# Patient Record
Sex: MALE | Race: WHITE
[De-identification: names, ages, dates, MRNs, and addresses within clinical notes are randomized per-mention and may not be internally consistent; named-entity substitution may affect disease eponyms.]

---

## 2019-03-06 ENCOUNTER — RECORD ABSTRACTING (OUTPATIENT)
Age: 64
End: 2019-03-06

## 2019-03-06 DIAGNOSIS — Z82.5 FAMILY HISTORY OF ASTHMA AND OTHER CHRONIC LOWER RESPIRATORY DISEASES: ICD-10-CM

## 2019-03-06 DIAGNOSIS — Z82.49 FAMILY HISTORY OF ISCHEMIC HEART DISEASE AND OTHER DISEASES OF THE CIRCULATORY SYSTEM: ICD-10-CM

## 2019-03-06 DIAGNOSIS — Z78.9 OTHER SPECIFIED HEALTH STATUS: ICD-10-CM

## 2019-03-06 DIAGNOSIS — M16.12 UNILATERAL PRIMARY OSTEOARTHRITIS, LEFT HIP: ICD-10-CM

## 2019-03-06 DIAGNOSIS — M16.11 UNILATERAL PRIMARY OSTEOARTHRITIS, RIGHT HIP: ICD-10-CM

## 2019-03-06 DIAGNOSIS — Z83.3 FAMILY HISTORY OF DIABETES MELLITUS: ICD-10-CM

## 2019-03-06 DIAGNOSIS — Z80.3 FAMILY HISTORY OF MALIGNANT NEOPLASM OF BREAST: ICD-10-CM

## 2022-03-01 ENCOUNTER — INPATIENT (INPATIENT)
Facility: HOSPITAL | Age: 67
LOS: 1 days | Discharge: HOME | DRG: 310 | End: 2022-03-03
Attending: FAMILY MEDICINE | Admitting: INTERNAL MEDICINE
Payer: COMMERCIAL

## 2022-03-01 VITALS — HEIGHT: 76 IN | WEIGHT: 298.95 LBS

## 2022-03-01 LAB
ALBUMIN SERPL ELPH-MCNC: 3.6 G/DL — SIGNIFICANT CHANGE UP (ref 3.3–5)
ALP SERPL-CCNC: 38 U/L — LOW (ref 40–120)
ALT FLD-CCNC: 37 U/L — SIGNIFICANT CHANGE UP (ref 12–78)
ANION GAP SERPL CALC-SCNC: 4 MMOL/L — LOW (ref 5–17)
AST SERPL-CCNC: 24 U/L — SIGNIFICANT CHANGE UP (ref 15–37)
BASOPHILS # BLD AUTO: 0.03 K/UL — SIGNIFICANT CHANGE UP (ref 0–0.2)
BASOPHILS NFR BLD AUTO: 0.4 % — SIGNIFICANT CHANGE UP (ref 0–2)
BILIRUB SERPL-MCNC: 0.4 MG/DL — SIGNIFICANT CHANGE UP (ref 0.2–1.2)
BUN SERPL-MCNC: 27 MG/DL — HIGH (ref 7–23)
CALCIUM SERPL-MCNC: 8.9 MG/DL — SIGNIFICANT CHANGE UP (ref 8.5–10.1)
CHLORIDE SERPL-SCNC: 109 MMOL/L — HIGH (ref 96–108)
CO2 SERPL-SCNC: 25 MMOL/L — SIGNIFICANT CHANGE UP (ref 22–31)
CREAT SERPL-MCNC: 1.12 MG/DL — SIGNIFICANT CHANGE UP (ref 0.5–1.3)
EGFR: 73 ML/MIN/1.73M2 — SIGNIFICANT CHANGE UP
EOSINOPHIL # BLD AUTO: 0.22 K/UL — SIGNIFICANT CHANGE UP (ref 0–0.5)
EOSINOPHIL NFR BLD AUTO: 3.1 % — SIGNIFICANT CHANGE UP (ref 0–6)
GLUCOSE SERPL-MCNC: 123 MG/DL — HIGH (ref 70–99)
HCT VFR BLD CALC: 45.1 % — SIGNIFICANT CHANGE UP (ref 39–50)
HGB BLD-MCNC: 14.8 G/DL — SIGNIFICANT CHANGE UP (ref 13–17)
IMM GRANULOCYTES NFR BLD AUTO: 0.3 % — SIGNIFICANT CHANGE UP (ref 0–1.5)
LYMPHOCYTES # BLD AUTO: 2.26 K/UL — SIGNIFICANT CHANGE UP (ref 1–3.3)
LYMPHOCYTES # BLD AUTO: 32.1 % — SIGNIFICANT CHANGE UP (ref 13–44)
MCHC RBC-ENTMCNC: 28.6 PG — SIGNIFICANT CHANGE UP (ref 27–34)
MCHC RBC-ENTMCNC: 32.8 GM/DL — SIGNIFICANT CHANGE UP (ref 32–36)
MCV RBC AUTO: 87.1 FL — SIGNIFICANT CHANGE UP (ref 80–100)
MONOCYTES # BLD AUTO: 0.82 K/UL — SIGNIFICANT CHANGE UP (ref 0–0.9)
MONOCYTES NFR BLD AUTO: 11.6 % — SIGNIFICANT CHANGE UP (ref 2–14)
NEUTROPHILS # BLD AUTO: 3.7 K/UL — SIGNIFICANT CHANGE UP (ref 1.8–7.4)
NEUTROPHILS NFR BLD AUTO: 52.5 % — SIGNIFICANT CHANGE UP (ref 43–77)
PLATELET # BLD AUTO: 165 K/UL — SIGNIFICANT CHANGE UP (ref 150–400)
POTASSIUM SERPL-MCNC: 3.7 MMOL/L — SIGNIFICANT CHANGE UP (ref 3.5–5.3)
POTASSIUM SERPL-SCNC: 3.7 MMOL/L — SIGNIFICANT CHANGE UP (ref 3.5–5.3)
PROT SERPL-MCNC: 7.1 GM/DL — SIGNIFICANT CHANGE UP (ref 6–8.3)
RBC # BLD: 5.18 M/UL — SIGNIFICANT CHANGE UP (ref 4.2–5.8)
RBC # FLD: 13 % — SIGNIFICANT CHANGE UP (ref 10.3–14.5)
SODIUM SERPL-SCNC: 138 MMOL/L — SIGNIFICANT CHANGE UP (ref 135–145)
TROPONIN I, HIGH SENSITIVITY RESULT: 11.41 NG/L — SIGNIFICANT CHANGE UP
WBC # BLD: 7.05 K/UL — SIGNIFICANT CHANGE UP (ref 3.8–10.5)
WBC # FLD AUTO: 7.05 K/UL — SIGNIFICANT CHANGE UP (ref 3.8–10.5)

## 2022-03-01 PROCEDURE — 71045 X-RAY EXAM CHEST 1 VIEW: CPT | Mod: 26

## 2022-03-01 PROCEDURE — 70450 CT HEAD/BRAIN W/O DYE: CPT

## 2022-03-01 PROCEDURE — 36415 COLL VENOUS BLD VENIPUNCTURE: CPT

## 2022-03-01 PROCEDURE — 99222 1ST HOSP IP/OBS MODERATE 55: CPT

## 2022-03-01 PROCEDURE — 83036 HEMOGLOBIN GLYCOSYLATED A1C: CPT

## 2022-03-01 PROCEDURE — 92960 CARDIOVERSION ELECTRIC EXT: CPT

## 2022-03-01 PROCEDURE — 93325 DOPPLER ECHO COLOR FLOW MAPG: CPT

## 2022-03-01 PROCEDURE — 70551 MRI BRAIN STEM W/O DYE: CPT

## 2022-03-01 PROCEDURE — 86803 HEPATITIS C AB TEST: CPT

## 2022-03-01 PROCEDURE — 93005 ELECTROCARDIOGRAM TRACING: CPT

## 2022-03-01 PROCEDURE — 99285 EMERGENCY DEPT VISIT HI MDM: CPT

## 2022-03-01 PROCEDURE — 93010 ELECTROCARDIOGRAM REPORT: CPT

## 2022-03-01 PROCEDURE — 93312 ECHO TRANSESOPHAGEAL: CPT

## 2022-03-01 PROCEDURE — 84484 ASSAY OF TROPONIN QUANT: CPT

## 2022-03-01 PROCEDURE — 85610 PROTHROMBIN TIME: CPT

## 2022-03-01 PROCEDURE — 80053 COMPREHEN METABOLIC PANEL: CPT

## 2022-03-01 PROCEDURE — 93320 DOPPLER ECHO COMPLETE: CPT

## 2022-03-01 PROCEDURE — 80061 LIPID PANEL: CPT

## 2022-03-01 PROCEDURE — 85730 THROMBOPLASTIN TIME PARTIAL: CPT

## 2022-03-01 PROCEDURE — 85025 COMPLETE CBC W/AUTO DIFF WBC: CPT

## 2022-03-01 RX ORDER — ASPIRIN/CALCIUM CARB/MAGNESIUM 324 MG
325 TABLET ORAL ONCE
Refills: 0 | Status: COMPLETED | OUTPATIENT
Start: 2022-03-01 | End: 2022-03-01

## 2022-03-01 RX ORDER — DILTIAZEM HCL 120 MG
10 CAPSULE, EXT RELEASE 24 HR ORAL ONCE
Refills: 0 | Status: DISCONTINUED | OUTPATIENT
Start: 2022-03-01 | End: 2022-03-01

## 2022-03-01 RX ADMIN — Medication 325 MILLIGRAM(S): at 22:50

## 2022-03-01 NOTE — ED ADULT NURSE NOTE - CHIEF COMPLAINT QUOTE
c/o dizziness 30 minutes PTA, HR at home 155, O2 sat in triage 96% on RA, pulse 77, patient started on beta blocker for PVC last month, denies pain, patient states he has been feeling chest tightness and fatigue for past few days, was positive covid on 01/25/2022

## 2022-03-01 NOTE — ED ADULT NURSE NOTE - OBJECTIVE STATEMENT
67 y/o a&ox4 male pt. presents to the ED c/o dizziness, fatigue today. pt. states that he laid down to get into bed this morning and "felt like the bed flipped." pt. recently put on beta blockers for PVC's. hx of afib that resolved on its own. pt on cardiac monitor now shows afib with HR . pt. denies fevers, headache, chills, n/v/d.

## 2022-03-01 NOTE — ED ADULT TRIAGE NOTE - CHIEF COMPLAINT QUOTE
c/o dizziness 30 minutes PTA, HR at home 155, O2 sat in triage 965 on RA, pulse 77, patient started on beta blocker for PVC last month, denies pain, patient states he has been feeling chest tightness and fatigue for past few days, was positive covid on 01/25/2022 c/o dizziness 30 minutes PTA, HR at home 155, O2 sat in triage 96% on RA, pulse 77, patient started on beta blocker for PVC last month, denies pain, patient states he has been feeling chest tightness and fatigue for past few days, was positive covid on 01/25/2022

## 2022-03-01 NOTE — ED PROVIDER NOTE - OBJECTIVE STATEMENT
67 y/o male in ED c/o episodes of dizziness with palpitations today.   pt states was started on BB by his PMD last month.    pt states h/o AF 12 years ago that resolved and not on meds for AF.   pt denies any fever, HA, cp, sob, n/v/d/abd pain.

## 2022-03-01 NOTE — PHARMACOTHERAPY INTERVENTION NOTE - COMMENTS
Medication reconciliation completed.  Reviewed Medication list and confirmed med allergies with patient; confirmed with Dr. First Medenma.

## 2022-03-02 ENCOUNTER — TRANSCRIPTION ENCOUNTER (OUTPATIENT)
Age: 67
End: 2022-03-02

## 2022-03-02 DIAGNOSIS — I48.91 UNSPECIFIED ATRIAL FIBRILLATION: ICD-10-CM

## 2022-03-02 LAB
A1C WITH ESTIMATED AVERAGE GLUCOSE RESULT: 5.8 % — HIGH (ref 4–5.6)
ALBUMIN SERPL ELPH-MCNC: 3.6 G/DL — SIGNIFICANT CHANGE UP (ref 3.3–5)
ALP SERPL-CCNC: 34 U/L — LOW (ref 40–120)
ALT FLD-CCNC: 32 U/L — SIGNIFICANT CHANGE UP (ref 12–78)
ANION GAP SERPL CALC-SCNC: 7 MMOL/L — SIGNIFICANT CHANGE UP (ref 5–17)
APTT BLD: 25.6 SEC — LOW (ref 27.5–35.5)
AST SERPL-CCNC: 18 U/L — SIGNIFICANT CHANGE UP (ref 15–37)
BASOPHILS # BLD AUTO: 0.03 K/UL — SIGNIFICANT CHANGE UP (ref 0–0.2)
BASOPHILS NFR BLD AUTO: 0.5 % — SIGNIFICANT CHANGE UP (ref 0–2)
BILIRUB SERPL-MCNC: 0.7 MG/DL — SIGNIFICANT CHANGE UP (ref 0.2–1.2)
BUN SERPL-MCNC: 21 MG/DL — SIGNIFICANT CHANGE UP (ref 7–23)
CALCIUM SERPL-MCNC: 9 MG/DL — SIGNIFICANT CHANGE UP (ref 8.5–10.1)
CHLORIDE SERPL-SCNC: 110 MMOL/L — HIGH (ref 96–108)
CHOLEST SERPL-MCNC: 224 MG/DL — HIGH
CO2 SERPL-SCNC: 25 MMOL/L — SIGNIFICANT CHANGE UP (ref 22–31)
CREAT SERPL-MCNC: 0.87 MG/DL — SIGNIFICANT CHANGE UP (ref 0.5–1.3)
EGFR: 96 ML/MIN/1.73M2 — SIGNIFICANT CHANGE UP
EOSINOPHIL # BLD AUTO: 0.18 K/UL — SIGNIFICANT CHANGE UP (ref 0–0.5)
EOSINOPHIL NFR BLD AUTO: 2.7 % — SIGNIFICANT CHANGE UP (ref 0–6)
ESTIMATED AVERAGE GLUCOSE: 120 MG/DL — HIGH (ref 68–114)
GLUCOSE SERPL-MCNC: 103 MG/DL — HIGH (ref 70–99)
HCT VFR BLD CALC: 44.3 % — SIGNIFICANT CHANGE UP (ref 39–50)
HCV AB S/CO SERPL IA: 0.09 S/CO — SIGNIFICANT CHANGE UP (ref 0–0.99)
HCV AB SERPL-IMP: SIGNIFICANT CHANGE UP
HDLC SERPL-MCNC: 56 MG/DL — SIGNIFICANT CHANGE UP
HGB BLD-MCNC: 14.7 G/DL — SIGNIFICANT CHANGE UP (ref 13–17)
IMM GRANULOCYTES NFR BLD AUTO: 0.2 % — SIGNIFICANT CHANGE UP (ref 0–1.5)
INR BLD: 1.09 RATIO — SIGNIFICANT CHANGE UP (ref 0.88–1.16)
LIPID PNL WITH DIRECT LDL SERPL: 150 MG/DL — HIGH
LYMPHOCYTES # BLD AUTO: 2.23 K/UL — SIGNIFICANT CHANGE UP (ref 1–3.3)
LYMPHOCYTES # BLD AUTO: 34 % — SIGNIFICANT CHANGE UP (ref 13–44)
MCHC RBC-ENTMCNC: 28.7 PG — SIGNIFICANT CHANGE UP (ref 27–34)
MCHC RBC-ENTMCNC: 33.2 GM/DL — SIGNIFICANT CHANGE UP (ref 32–36)
MCV RBC AUTO: 86.5 FL — SIGNIFICANT CHANGE UP (ref 80–100)
MONOCYTES # BLD AUTO: 0.72 K/UL — SIGNIFICANT CHANGE UP (ref 0–0.9)
MONOCYTES NFR BLD AUTO: 11 % — SIGNIFICANT CHANGE UP (ref 2–14)
NEUTROPHILS # BLD AUTO: 3.38 K/UL — SIGNIFICANT CHANGE UP (ref 1.8–7.4)
NEUTROPHILS NFR BLD AUTO: 51.6 % — SIGNIFICANT CHANGE UP (ref 43–77)
NON HDL CHOLESTEROL: 168 MG/DL — HIGH
PLATELET # BLD AUTO: 161 K/UL — SIGNIFICANT CHANGE UP (ref 150–400)
POTASSIUM SERPL-MCNC: 4 MMOL/L — SIGNIFICANT CHANGE UP (ref 3.5–5.3)
POTASSIUM SERPL-SCNC: 4 MMOL/L — SIGNIFICANT CHANGE UP (ref 3.5–5.3)
PROT SERPL-MCNC: 6.9 GM/DL — SIGNIFICANT CHANGE UP (ref 6–8.3)
PROTHROM AB SERPL-ACNC: 12.6 SEC — SIGNIFICANT CHANGE UP (ref 10.5–13.4)
RAPID RVP RESULT: SIGNIFICANT CHANGE UP
RBC # BLD: 5.12 M/UL — SIGNIFICANT CHANGE UP (ref 4.2–5.8)
RBC # FLD: 13 % — SIGNIFICANT CHANGE UP (ref 10.3–14.5)
SARS-COV-2 RNA SPEC QL NAA+PROBE: SIGNIFICANT CHANGE UP
SODIUM SERPL-SCNC: 142 MMOL/L — SIGNIFICANT CHANGE UP (ref 135–145)
TRIGL SERPL-MCNC: 88 MG/DL — SIGNIFICANT CHANGE UP
TROPONIN I, HIGH SENSITIVITY RESULT: 12.7 NG/L — SIGNIFICANT CHANGE UP
WBC # BLD: 6.55 K/UL — SIGNIFICANT CHANGE UP (ref 3.8–10.5)
WBC # FLD AUTO: 6.55 K/UL — SIGNIFICANT CHANGE UP (ref 3.8–10.5)

## 2022-03-02 PROCEDURE — 99232 SBSQ HOSP IP/OBS MODERATE 35: CPT

## 2022-03-02 PROCEDURE — 70450 CT HEAD/BRAIN W/O DYE: CPT | Mod: 26

## 2022-03-02 PROCEDURE — 99223 1ST HOSP IP/OBS HIGH 75: CPT

## 2022-03-02 PROCEDURE — 93010 ELECTROCARDIOGRAM REPORT: CPT

## 2022-03-02 PROCEDURE — 70551 MRI BRAIN STEM W/O DYE: CPT | Mod: 26

## 2022-03-02 RX ORDER — METOPROLOL TARTRATE 50 MG
100 TABLET ORAL DAILY
Refills: 0 | Status: DISCONTINUED | OUTPATIENT
Start: 2022-03-02 | End: 2022-03-02

## 2022-03-02 RX ORDER — APIXABAN 2.5 MG/1
5 TABLET, FILM COATED ORAL EVERY 12 HOURS
Refills: 0 | Status: DISCONTINUED | OUTPATIENT
Start: 2022-03-02 | End: 2022-03-02

## 2022-03-02 RX ORDER — ASPIRIN/CALCIUM CARB/MAGNESIUM 324 MG
325 TABLET ORAL DAILY
Refills: 0 | Status: DISCONTINUED | OUTPATIENT
Start: 2022-03-02 | End: 2022-03-02

## 2022-03-02 RX ORDER — METOPROLOL TARTRATE 50 MG
50 TABLET ORAL ONCE
Refills: 0 | Status: COMPLETED | OUTPATIENT
Start: 2022-03-02 | End: 2022-03-02

## 2022-03-02 RX ORDER — APIXABAN 2.5 MG/1
5 TABLET, FILM COATED ORAL EVERY 12 HOURS
Refills: 0 | Status: DISCONTINUED | OUTPATIENT
Start: 2022-03-02 | End: 2022-03-03

## 2022-03-02 RX ORDER — APIXABAN 2.5 MG/1
1 TABLET, FILM COATED ORAL
Qty: 60 | Refills: 0
Start: 2022-03-02 | End: 2022-03-31

## 2022-03-02 RX ORDER — METOPROLOL TARTRATE 50 MG
1 TABLET ORAL
Qty: 0 | Refills: 0 | DISCHARGE

## 2022-03-02 RX ORDER — METOPROLOL TARTRATE 50 MG
50 TABLET ORAL DAILY
Refills: 0 | Status: DISCONTINUED | OUTPATIENT
Start: 2022-03-02 | End: 2022-03-02

## 2022-03-02 RX ORDER — METOPROLOL TARTRATE 50 MG
100 TABLET ORAL DAILY
Refills: 0 | Status: DISCONTINUED | OUTPATIENT
Start: 2022-03-02 | End: 2022-03-03

## 2022-03-02 RX ORDER — METOPROLOL TARTRATE 50 MG
1 TABLET ORAL
Qty: 30 | Refills: 0
Start: 2022-03-02 | End: 2022-03-31

## 2022-03-02 RX ADMIN — APIXABAN 5 MILLIGRAM(S): 2.5 TABLET, FILM COATED ORAL at 10:37

## 2022-03-02 RX ADMIN — APIXABAN 5 MILLIGRAM(S): 2.5 TABLET, FILM COATED ORAL at 22:08

## 2022-03-02 RX ADMIN — Medication 325 MILLIGRAM(S): at 09:01

## 2022-03-02 RX ADMIN — Medication 50 MILLIGRAM(S): at 15:44

## 2022-03-02 RX ADMIN — Medication 50 MILLIGRAM(S): at 09:01

## 2022-03-02 NOTE — CONSULT NOTE ADULT - SUBJECTIVE AND OBJECTIVE BOX
Patient is a 66y old Male who presents with a chief complaint of dizziness (02 Mar 2022 04:17)    HPI:  65 y/o M w/ PMH of Afib (25 years ago, not on AC), HTN, presented to ED on 3/1/22 with c/o dizziness starting around 8pm. 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 Dizziness worsened when he turned his head to the right, there was no N/V. He also felt diaphoretic and noted his HR was elevated at 155. He went to ED, was found to have Afib, was then admitted for further evaluation. CTH was unremarkable.    Today, patient states dizziness has resolved. He did experience dizziness in ED last night, but has not had any further episodes today. He has been up and walked to the bathroom w/o any symptoms. Currently, he denies HA, lightheadedness, N/V, vision changes, speech disturbances, tinnitus, prior URI.     PAST MEDICAL & SURGICAL HISTORY:  Atrial fibrillation  S/Pappendectomy  S/P knee surgery  Abdominal hernia repair  L wrist surgery    FAMILY HISTORY:  Sister - breast cancer  mother - MI / DM2  father - emphysema / aortic aneurysm     Social Hx:  Former smoker at 19 y/o  Etoh - 1 drink per week  Denies any drug use    MEDICATIONS  (STANDING):  apixaban 5 milliGRAM(s) Oral every 12 hours  metoprolol succinate ER 50 milliGRAM(s) Oral daily     Allergies: No Known Allergies    ROS: Pertinent positives in HPI, all other ROS were reviewed and are negative.      Vital Signs Last 24 Hrs  T(C): 36.3 (02 Mar 2022 08:45), Max: 37.1 (01 Mar 2022 21:44)  T(F): 97.3 (02 Mar 2022 08:45), Max: 98.7 (01 Mar 2022 21:44)  HR: 95 (02 Mar 2022 09:00) (57 - 95)  BP: 128/82 (02 Mar 2022 09:00) (103/71 - 136/82)  BP(mean): 94 (02 Mar 2022 09:00) (82 - 113)  RR: 14 (02 Mar 2022 09:00) (14 - 19)  SpO2: 97% (02 Mar 2022 09:00) (97% - 100%)    Physical Exam:    General: Normocephalic, NAD  HEENT: PERRLA, EOMI,   Neck: Supple.  Respiratory: Breath sounds are clear bilaterally  Cardiovascular: S1 and S2, irregular rhythm  Extremities:  no edema  Vascular: Carotid Bruit - no  Musculoskeletal: no joint swelling/tenderness, no abnormal movements  Skin: No rashes    Neurological exam:  HF: A x O x 3. Appropriately interactive, normal affect. Speech fluent, No Aphasia or paraphasic errors. Naming /repetition intact   CN: DANIELLE, EOMI, VFF, no nystagmus noted, facial sensation normal, no NLFD, tongue midline, Palate moves equally  Motor: No pronator drift, Strength 5/5 in all 4 ext, normal bulk and tone, no tremor, rigidity or bradykinesia.    Sens: Intact to light touch throughout  Reflexes: Symmetric and normal, downgoing toes b/l  Coord:  No FNFA, dysmetria  Gait/Balance: Cannot test    Labs:   03-02    142  |  110<H>  |  21  ----------------------------<  103<H>  4.0   |  25  |  0.87    Ca    9.0      02 Mar 2022 08:06    TPro  6.9  /  Alb  3.6  /  TBili  0.7  /  DBili  x   /  AST  18  /  ALT  32  /  AlkPhos  34<L>  03-02                       14.7   6.55  )-----------( 161      ( 02 Mar 2022 08:06 )             44.3       Radiology:  CT Head No Cont (03.02.22 @ 04:47)   IMPRESSION:    No acute intracranial findings.    Patient is status post bilateral uncinectomies. There is a moderately   sized retention cyst versus polyp in the region of the right maxillary   postsurgical outflow tract and a small sized retention cyst versus polyp   in the region of the left maxillary postsurgical outflow tract.           Patient is a 66y old Male who presents with a chief complaint of dizziness (02 Mar 2022 04:17)    HPI:  65 y/o M w/ PMH of Afib (25 years ago, not on AC), HTN, presented to ED on 3/1/22 with c/o dizziness starting around 8 pm. 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 He also felt diaphoretic and noted his HR was elevated at 155. He went to ED, was found to have Afib, was admitted for further evaluation. CTH was unremarkable.    Today, on exam, patient states dizziness has resolved, he had dizziness and lightheadedness in ED last night, but has not had any further episodes today. He has been up and walked to the bathroom w/o any symptoms. Currently, he denies HA, lightheadedness, N/V, vision changes, speech disturbances, tinnitus, prior URI.       PAST MEDICAL & SURGICAL HISTORY:  Atrial fibrillation  S/P appendectomy  S/P knee surgery  Abdominal hernia repair  L wrist surgery    FAMILY HISTORY:  Sister - breast cancer  mother - MI / DM2  father - emphysema / aortic aneurysm     Social Hx:  Former smoker at 21 y/o  Etoh - 1 drink per week  Denies any drug use    MEDICATIONS  (STANDING):  apixaban 5 milliGRAM(s) Oral every 12 hours  metoprolol succinate ER 50 milliGRAM(s) Oral daily     Allergies: No Known Allergies    ROS: Pertinent positives in HPI, all other ROS were reviewed and are negative.      Vital Signs Last 24 Hrs  T(C): 36.3 (02 Mar 2022 08:45), Max: 37.1 (01 Mar 2022 21:44)  T(F): 97.3 (02 Mar 2022 08:45), Max: 98.7 (01 Mar 2022 21:44)  HR: 95 (02 Mar 2022 09:00) (57 - 95)  BP: 128/82 (02 Mar 2022 09:00) (103/71 - 136/82)  BP(mean): 94 (02 Mar 2022 09:00) (82 - 113)  RR: 14 (02 Mar 2022 09:00) (14 - 19)  SpO2: 97% (02 Mar 2022 09:00) (97% - 100%)    Physical Exam:    General: Normocephalic, NAD  HEENT: PERRLA, EOMI,   Neck: Supple.  Respiratory: Breath sounds are clear bilaterally  Cardiovascular: S1 and S2, irregular rhythm  Extremities:  no edema  Vascular: Carotid Bruit - no  Musculoskeletal: no joint swelling/tenderness, no abnormal movements  Skin: No rashes    Neurological exam:  HF: A x O x 3. Appropriately interactive, normal affect. Speech fluent, No Aphasia or paraphasic errors. Naming /repetition intact   CN: DANIELLE, EOMI, VFF, no nystagmus noted, facial sensation normal, no NLFD, tongue midline, Palate moves equally  Motor: No pronator drift, Strength 5/5 in all 4 ext, normal bulk and tone, no tremor, rigidity or bradykinesia.    Sens: Intact to light touch throughout  Reflexes: Symmetric and normal, downgoing toes b/l  Coord:  No FNFA, dysmetria  Gait/Balance: Cannot test    NIHSS-0    Labs:   03-02    142  |  110<H>  |  21  ----------------------------<  103<H>  4.0   |  25  |  0.87    Ca    9.0      02 Mar 2022 08:06    TPro  6.9  /  Alb  3.6  /  TBili  0.7  /  DBili  x   /  AST  18  /  ALT  32  /  AlkPhos  34<L>  03-02                       14.7   6.55  )-----------( 161      ( 02 Mar 2022 08:06 )             44.3       Radiology:  CT Head No Cont (03.02.22 @ 04:47)   IMPRESSION:    No acute intracranial findings.    Patient is status post bilateral uncinectomies. There is a moderately   sized retention cyst versus polyp in the region of the right maxillary   postsurgical outflow tract and a small sized retention cyst versus polyp   in the region of the left maxillary postsurgical outflow tract.

## 2022-03-02 NOTE — H&P ADULT - ASSESSMENT
67 y/o M w/ PMH of a-fib (25 years ago, not on AC), HTN, p/w dizziness     *A-fib w/ RVR  -Now rate controlled  -CHADsVasc = 1   -ASA  -Cardio consult  -Echo  -Tele monitoring    *Dizziness - possibly cardiogenic in nature, but also sounds like vertigo?  -CTH  -Neuro consult  -Fall precautions   -Orthostatics     *DVT ppx  -SCDs  67 y/o M w/ PMH of a-fib (25 years ago, not on AC), HTN, p/w dizziness     *A-fib w/ RVR  -Now rate controlled  -CHADsVasc = 1   -ASA  -Cardio consult  -Echo  -Tele monitoring    *Dizziness - possibly cardiogenic in nature, but also sounds like possible vertigo  -CTH  -Neuro consult  -Fall precautions   -Orthostatics     *DVT ppx  -SCDs

## 2022-03-02 NOTE — DISCHARGE NOTE PROVIDER - NSDCMRMEDTOKEN_GEN_ALL_CORE_FT
apixaban 5 mg oral tablet: 1 tab(s) orally every 12 hours  Metoprolol Succinate  mg oral tablet, extended release: 1 tab(s) orally once a day    apixaban 5 mg oral tablet: 1 tab(s) orally every 12 hours  atorvastatin 20 mg oral tablet: 1 tab(s) orally once a day (at bedtime)  metoprolol succinate 50 mg oral tablet, extended release: 1 tab(s) orally once a day

## 2022-03-02 NOTE — DISCHARGE NOTE PROVIDER - HOSPITAL COURSE
CC: dizziness (02 Mar 2022 10:25)    HPI: 67 y/o M w/ PMH of a-fib (25 years ago, not on AC), HTN, p/w dizziness starting around 8pm today. 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 He felt dizziness like the bed flipped, and like room was spinning. He also felt diaphoretic as well. Patient states that his apple watch also had HR as high as 155. Even at present time, if he turns his head all the way to the side, he feel dizziness, spinning sensation. Denies LOC. Denies CP, SOB, cough, runny nose, sore throat, nausea, vomiting, abdominal pain, fever, chills.      INTERVAL HPI/OVERNIGHT EVENTS: symptoms resolved, remains in Afib with controlled rate, awaiting MRI  Other ROS reviewed and neg     Vital Signs Last 24 Hrs  T(C): 36.3 (02 Mar 2022 08:45), Max: 37.1 (01 Mar 2022 21:44)  T(F): 97.3 (02 Mar 2022 08:45), Max: 98.7 (01 Mar 2022 21:44)  HR: 84 (02 Mar 2022 11:30) (57 - 95)  BP: 124/78 (02 Mar 2022 11:30) (103/71 - 136/82)  BP(mean): 87 (02 Mar 2022 11:30) (82 - 113)  RR: 14 (02 Mar 2022 09:00) (14 - 19)  SpO2: 98% (02 Mar 2022 11:30) (97% - 100%)  I&O's Detail  Total OUT: 275 mL  Total NET: -275 mL  LABS personally reviewed  MEDICATIONS reviewed  RADIOLOGY & ADDITIONAL TESTS: personally visualized    Dizziness due to new onset Afib - now rate controlled with BBL and AC with eliquis, follow up with cardiology next monday and potential DCCV if not converted  - as per neurology MRI to r/o central etiology prior to DC    If neg MRI can be discharged home  Time spent 65 min  Discussed with Dr. Zapata and Neurology PA. CC: dizziness (02 Mar 2022 10:25)    HPI: 65 y/o M w/ PMH of a-fib (25 years ago, not on AC), HTN, p/w dizziness     Dizziness due to new onset Afib - now rate controlled with BBL and AC with eliquis, follow up with cardiology next monday and potential DCCV if not converted  - as per neurology MRI to r/o central etiology prior to DC - neg    Pt continue to have RVR to 160's with exertion despite increased BBL - DCCV to NSR this am.    Stable for DC    Time spent 65 min  Discussed with Dr. Zapata and Neurology PA.

## 2022-03-02 NOTE — DISCHARGE NOTE NURSING/CASE MANAGEMENT/SOCIAL WORK - NSDCPEFALRISK_GEN_ALL_CORE
For information on Fall & Injury Prevention, visit: [email or web address] OR  [email or web address] OR  https://www.cdc.gov/steadi/patient.html

## 2022-03-02 NOTE — DISCHARGE NOTE PROVIDER - ATTENDING DISCHARGE PHYSICAL EXAMINATION:
PHYSICAL EXAM:    General: obese male in no acute distress  Eyes: PERRLA, EOMI; conjunctiva and sclera clear  Head: Normocephalic; atraumatic  ENMT: No nasal discharge; airway clear  Neck: Supple; non tender; no masses  Respiratory: No wheezes, rales or rhonchi  Cardiovascular: S1, S2 irreg  Gastrointestinal: Soft non-tender non-distended; Normal bowel sounds  Genitourinary: No costovertebral angle tenderness  Extremities: No clubbing, cyanosis or edema  Vascular: Peripheral pulses palpable 2+ bilaterally  Neurological: Alert and oriented x4  Skin: Warm and dry.   Musculoskeletal: Normal tone  Psychiatric: Cooperative and appropriate PHYSICAL EXAM:    General: obese male in no acute distress  Eyes: PERRLA, EOMI; conjunctiva and sclera clear  Head: Normocephalic; atraumatic  ENMT: No nasal discharge; airway clear  Neck: Supple; non tender; no masses  Respiratory: No wheezes, rales or rhonchi  Cardiovascular: S1, S2 reg  Gastrointestinal: Soft non-tender non-distended; Normal bowel sounds  Genitourinary: No costovertebral angle tenderness  Extremities: No clubbing, cyanosis or edema  Vascular: Peripheral pulses palpable 2+ bilaterally  Neurological: Alert and oriented x4  Skin: Warm and dry.   Musculoskeletal: Normal tone  Psychiatric: Cooperative and appropriate

## 2022-03-02 NOTE — DISCHARGE NOTE PROVIDER - NSDCCPCAREPLAN_GEN_ALL_CORE_FT
PRINCIPAL DISCHARGE DIAGNOSIS  Diagnosis: Atrial fibrillation  Assessment and Plan of Treatment: new onset: metoprolol and eliquis      SECONDARY DISCHARGE DIAGNOSES  Diagnosis: Dizzy  Assessment and Plan of Treatment: f/u MRI results

## 2022-03-02 NOTE — DISCHARGE NOTE NURSING/CASE MANAGEMENT/SOCIAL WORK - PATIENT PORTAL LINK FT
You can access the FollowMyHealth Patient Portal offered by [facility] by registering at the following website: [email or web address]. By joining [employer]’s FollowMyHealth portal, you will also be able to view your health information using other applications (apps) compatible with our system.

## 2022-03-02 NOTE — DISCHARGE NOTE PROVIDER - CARE PROVIDER_API CALL
Anna Zapata (DO; LYNNE)  Cardiology  [address] [place] [address]  Phone: [phone]  Fax: [phone]  Follow Up Time:

## 2022-03-02 NOTE — PATIENT PROFILE ADULT - FALL HARM RISK - HARM RISK INTERVENTIONS

## 2022-03-02 NOTE — CONSULT NOTE ADULT - SUBJECTIVE AND OBJECTIVE BOX
Patient is a 66y old  Male who presents with a chief complaint of dizziness (02 Mar 2022 11:31)    ________________________________  DChani POWELL is a 66y year old Male with a past medical history of remote atrial fibrillation several years ago status post guided cardioversion, previously not on any medical therapy until recently.  He was seen by his primary care doctor Stevie and started on metoprolol for palpitations when he was noted to have PVCs and schedule for cardiology consultation next week.  He is not on aspirin.  He presents now for dizziness, described as vertigo, which is associated diaphoresis palpitations.  Was noted to be in atrial fibrillation according to his apple watch 255 bpm.  His heart is improved with metoprolol.  Currently is feeling well without any further dizziness.  CT head shows no acute abnormalities.  Cardiac enzymes negative.  Outpatient TSH normal.      No recent cardiac work-up.      ________________________________  Review of systems: A 10 point review of system has been performed, and is negative except for what has been mentioned in the above history of present illness.     PAST MEDICAL & SURGICAL HISTORY:  Atrial fibrillation  appendectomy, knee surgery, abdominal hernia repair, L wrist surgery    FAMILY HISTORY:  No pertinent family history in first degree relatives      SOCIAL HISTORY: Prior tobacco abuse and alcohol abuse.    ALLERGIES:  No Known Allergies    Home Medications:    MEDICATIONS  (STANDING):  apixaban 5 milliGRAM(s) Oral every 12 hours  apixaban 5 milliGRAM(s) Oral every 12 hours  metoprolol succinate  milliGRAM(s) Oral daily    MEDICATIONS  (PRN):    Vital Signs Last 24 Hrs  T(C): 36.3 (02 Mar 2022 08:45), Max: 37.1 (01 Mar 2022 21:44)  T(F): 97.3 (02 Mar 2022 08:45), Max: 98.7 (01 Mar 2022 21:44)  HR: 84 (02 Mar 2022 11:30) (57 - 95)  BP: 124/78 (02 Mar 2022 11:30) (103/71 - 136/82)  BP(mean): 87 (02 Mar 2022 11:30) (82 - 113)  RR: 14 (02 Mar 2022 09:00) (14 - 19)  SpO2: 98% (02 Mar 2022 11:30) (97% - 100%)  I&O's Summary    01 Mar 2022 07:01  -  02 Mar 2022 07:00  --------------------------------------------------------  IN: 0 mL / OUT: 275 mL / NET: -275 mL      ________________________________  GENERAL APPEARANCE:  No acute distress  HEAD: normocephalic, atraumatic  NECK: supple, no jugular venous distention, no carotid bruit    HEART: Irregularly irregular, S1, S2 normal, 1/6 murmur    CHEST:  No anterior chest wall tenderness    LUNGS:  Clear to auscultation, without any wheezing, rhonchi or rales    ABDOMEN: soft, nontender, nondistended, with positive bowel sounds appreciated  EXTREMITIES: no edema.   NEURO: Alert and oriented x3  PSYC:  Normal affect  SKIN:  Dry  ________________________________   TELEMETRY: Rate control A. fib with PVCs    ECG: Per my trepidation, atrial fibrillation with nonspecific changes and PVCs    LABS:                        14.7   6.55  )-----------( 161      ( 02 Mar 2022 08:06 )             44.3             03-02    142  |  110<H>  |  21  ----------------------------<  103<H>  4.0   |  25  |  0.87    Ca    9.0      02 Mar 2022 08:06    TPro  6.9  /  Alb  3.6  /  TBili  0.7  /  DBili  x   /  AST  18  /  ALT  32  /  AlkPhos  34<L>  03-02      Lipid Panel  Chl 224  HDL 56  LDL --  Trg 88  LIVER FUNCTIONS - ( 02 Mar 2022 08:06 )  Alb: 3.6 g/dL / Pro: 6.9 gm/dL / ALK PHOS: 34 U/L / ALT: 32 U/L / AST: 18 U/L / GGT: x         PT/INR - ( 02 Mar 2022 08:06 )   PT: 12.6 sec;   INR: 1.09 ratio         PTT - ( 02 Mar 2022 08:06 )  PTT:25.6 sec      PT/INR - ( 02 Mar 2022 08:06 )   PT: 12.6 sec;   INR: 1.09 ratio         PTT - ( 02 Mar 2022 08:06 )  PTT:25.6 sec           ________________________________    RADIOLOGY & ADDITIONAL STUDIES: CT head negative for any acute findings  ________________________________    ASSESSMENT:  Paroxysmal atrial fibrillation-recurrence  Dizziness-CVA ruled out on CT, MRI pending    PLAN:  In summary, this is a 66y Male with a past medical history of paroxysmal atrial fibrillation, status post KUN guided cardioversion several years ago, admitted for palpitations, and vertigo.  Vertigo has resolved.  CT head shows no acute abnormalities.  Given atrial fibrillation, increase beta-blocker, and start full dose anticoagulation.  Up to 40% of patients can convert spontaneously with AV carlos blockers within the first 72 hours.  He has a follow-up on Monday.  If he remains in A. fib at that time we will plan for cardioversion.  MRI per neurology.    __________________________________________________________________________  Thank you for allowing me to participate in the care of your patient. Please contact me should any questions arise.    ALICIA Zapata DO, [facility]  Office: [phone]

## 2022-03-02 NOTE — H&P ADULT - HISTORY OF PRESENT ILLNESS
67 y/o M w/ PMH of a-fib (25 years ago, not on AC), HTN, p/w dizziness starting around 8pm today. 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 He felt dizziness like the bed flipped, and like room was spinning. He also felt diaphoretic as well. Patient states that his apple watch also had HR as high as 155. Even at present time, if he turns his head all the way to the side, he feel dizziness, spinning sensation. Denies LOC. Denies CP, SOB, cough, runny nose, sore throat, nausea, vomiting, abdominal pain, fever, chills.      PSH: appendectomy, knee surgery, abdominal hernia repair, L wrist surgery    Social Hx: Former smoker at 19 y/o, Etoh - 1 drink per week, drugs - denies     Family Hx: Sister - breast cancer, mother - MI / DM2, father - emphysema / aortic aneurysm

## 2022-03-02 NOTE — CONSULT NOTE ADULT - ATTENDING COMMENTS
Pt seen and examined.    In addition to above A&P    # Acute onset lightheadedness / diaphoresis / dizziness - symptoms may be vertigo, however are not typical, may reflect A-fib , remote possibility embolic CVA.    - I spoke with Dr. Wright cardio, he agrees to start Eliquis  - We will obtain MRI brain    Above D/W pt and Dr. Wright

## 2022-03-02 NOTE — CONSULT NOTE ADULT - ASSESSMENT
65 y/o M w/ PMH of Afib (25 years ago, not on AC), HTN, presented to ED on 3/1/22 with c/o dizziness starting around 8pm, he was sleeping when he woke up and went to the bathroom, came back to lie down, and suddenly felt dizzy and like the entire room was spinning. Dizziness worsened when he turned his head to the right, there was no N/V. He also felt diaphoretic and noted his HR was elevated at 155. He went to ED, was found to have Afib, was then admitted for further evaluation. CTH was unremarkable.    Today, patient states dizziness has resolved. He did experience dizziness in ED last night, but has not had any further episodes today. He has been up and walked to the bathroom w/o any symptoms. Currently, he denies HA, lightheadedness, N/V, vision changes, speech disturbances, tinnitus, prior URI.               -ASA/PLAVIX or No ASA plavix for 24 hrs (if TPA was given)  -Continue Atorvastatin  -MRI head  -DVT prophylaxis  -Dysphagia screen  -Echo  -Speech and swallow eval  -PT eval/ rehab eval  -Telemonitoring     65 y/o M w/ PMH of Afib (25 years ago, not on AC), HTN, presented to ED on 3/1/22 with c/o dizziness starting around 8pm, he was sleeping when he woke up and went to the bathroom, came back to lie down, and suddenly felt dizzy and like the entire room was spinning, also felt diaphoretic and noted his HR was elevated at 155. He went to ED, was found to have Afib, was then admitted for further evaluation. CTH was unremarkable.    #Transient vertigo, may be related to Afib  -Metoprolol and Eliquis, as per cardiology  -Echo has been ordered  -Continue telemonitoring    #?BPPV, less likely, will order MRI head to r/o central cause/CVA as patient just started AC  -MRI head ordered  -Ambulate with PT  -Can f/u with Vestibular PT as OP if symptoms return       Patient was discussed with Dr. Ramirez         65 y/o M w/ PMH of Afib (25 years ago, not on AC), HTN, presented to ED on 3/1/22 with c/o dizziness starting around 8 pm, he was sleeping when he woke up and went to the bathroom, came back to lie down, and suddenly felt dizzy and like the entire room was spinning, also felt diaphoretic and noted his HR was elevated at 155. He went to ED, was found to have Afib, was then admitted for further evaluation. CTH was unremarkable.    # Transient vertigo, may be related to Afib  -On Metoprolol, cardio plans to start and Eliquis  -Echo has been ordered  -Continue telemonitoring    #?BPPV, less likely, will order MRI head to r/o central cause/CVA as patient just started AC  -MRI head ordered  -Ambulate with PT  -Can f/u with Vestibular PT as OP if symptoms return       Patient was discussed with Dr. Ramirez

## 2022-03-03 VITALS — HEART RATE: 56 BPM | DIASTOLIC BLOOD PRESSURE: 70 MMHG | SYSTOLIC BLOOD PRESSURE: 101 MMHG

## 2022-03-03 PROCEDURE — 99239 HOSP IP/OBS DSCHRG MGMT >30: CPT

## 2022-03-03 PROCEDURE — 93010 ELECTROCARDIOGRAM REPORT: CPT | Mod: 76

## 2022-03-03 RX ORDER — ATORVASTATIN CALCIUM 80 MG/1
1 TABLET, FILM COATED ORAL
Qty: 30 | Refills: 0
Start: 2022-03-03 | End: 2022-04-01

## 2022-03-03 RX ORDER — ATORVASTATIN CALCIUM 80 MG/1
20 TABLET, FILM COATED ORAL AT BEDTIME
Refills: 0 | Status: DISCONTINUED | OUTPATIENT
Start: 2022-03-03 | End: 2022-03-03

## 2022-03-03 RX ORDER — METOPROLOL TARTRATE 50 MG
1 TABLET ORAL
Qty: 30 | Refills: 0
Start: 2022-03-03 | End: 2022-04-01

## 2022-03-03 RX ADMIN — Medication 100 MILLIGRAM(S): at 08:06

## 2022-03-03 RX ADMIN — APIXABAN 5 MILLIGRAM(S): 2.5 TABLET, FILM COATED ORAL at 08:06

## 2022-03-03 NOTE — PROCEDURAL SAFETY CHECKLIST WITH OR WITHOUT SEDATION - NSPOSTCOMMENTFT_GEN_ALL_CORE
probe passed 846 out 852 cardioverted ay 150 jouls times 1 successful at 854 pt tolerated well maintained safety will continue to monitor see anesthesia

## 2022-03-03 NOTE — PROGRESS NOTE ADULT - ASSESSMENT
Afib with RVR with exertion - s/p DCCV in NSR on toprol XL 50, eliquis and statin, f/u with cardiology on monday. Noted transient reduction of EF with DCCV resolved right after    Dizziness due to above - resolved, no central etiology as neg MRI brain    Medically stable for DC    Discussed with Dr. mcmullen, time spent 65 min  Meds adjusted

## 2022-03-03 NOTE — PROGRESS NOTE ADULT - SUBJECTIVE AND OBJECTIVE BOX
CC: dizziness (02 Mar 2022 12:21)    HPI: 65 y/o M w/ PMH of a-fib (25 years ago, not on AC), HTN, p/w dizziness starting around 8pm today. 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 He felt dizziness like the bed flipped, and like room was spinning. He also felt diaphoretic as well. Patient states that his apple watch also had HR as high as 155. Even at present time, if he turns his head all the way to the side, he feel dizziness, spinning sensation. Denies LOC. Denies CP, SOB, cough, runny nose, sore throat, nausea, vomiting, abdominal pain, fever, chills.      INTERVAL HPI/OVERNIGHT EVENTS: s/p DCCV in NSR  Other ROS reviewed and neg     Vital Signs Last 24 Hrs  T(C): 36.2 (03 Mar 2022 09:06), Max: 36.6 (03 Mar 2022 08:17)  T(F): 97.2 (03 Mar 2022 09:06), Max: 97.8 (03 Mar 2022 08:17)  HR: 56 (03 Mar 2022 10:00) (56 - 100)  BP: 101/70 (03 Mar 2022 10:00) (72/30 - 140/64)  BP(mean): 77 (03 Mar 2022 10:00) (39 - 87)  RR: 17 (03 Mar 2022 09:36) (17 - 17)  SpO2: 99% (03 Mar 2022 09:36) (96% - 100%)  I&O's Detail    02 Mar 2022 07:01  -  03 Mar 2022 07:00  --------------------------------------------------------  IN:  Total IN: 0 mL    OUT:    Voided (mL): 600 mL  Total OUT: 600 mL    Total NET: -600 mL                                    14.7   6.55  )-----------( 161      ( 02 Mar 2022 08:06 )             44.3     02 Mar 2022 08:06    142    |  110    |  21     ----------------------------<  103    4.0     |  25     |  0.87     Ca    9.0        02 Mar 2022 08:06    TPro  6.9    /  Alb  3.6    /  TBili  0.7    /  DBili  x      /  AST  18     /  ALT  32     /  AlkPhos  34     02 Mar 2022 08:06    PT/INR - ( 02 Mar 2022 08:06 )   PT: 12.6 sec;   INR: 1.09 ratio         PTT - ( 02 Mar 2022 08:06 )  PTT:25.6 sec  CAPILLARY BLOOD GLUCOSE        LIVER FUNCTIONS - ( 02 Mar 2022 08:06 )  Alb: 3.6 g/dL / Pro: 6.9 gm/dL / ALK PHOS: 34 U/L / ALT: 32 U/L / AST: 18 U/L / GGT: x            MEDICATIONS  (STANDING):  apixaban 5 milliGRAM(s) Oral every 12 hours  atorvastatin 20 milliGRAM(s) Oral at bedtime  metoprolol succinate  milliGRAM(s) Oral daily    RADIOLOGY & ADDITIONAL TESTS: personally visualized    PHYSICAL EXAM:    General: obese male in no acute distress  Eyes: PERRLA, EOMI; conjunctiva and sclera clear  Head: Normocephalic; atraumatic  ENMT: No nasal discharge; airway clear  Neck: Supple; non tender; no masses  Respiratory: No wheezes, rales or rhonchi  Cardiovascular: S1, S2 reg  Gastrointestinal: Soft non-tender non-distended; Normal bowel sounds  Genitourinary: No costovertebral angle tenderness  Extremities: Normal range of motion, No clubbing, cyanosis or edema  Vascular: Peripheral pulses palpable 2+ bilaterally  Neurological: Alert and oriented x4  Skin: Warm and dry.   Musculoskeletal: Normal gait, tone, without deformities  Psychiatric: Cooperative and appropriate

## 2022-03-03 NOTE — PROCEDURE NOTE - ADDITIONAL PROCEDURE DETAILS
KUN DCCV Procedure Note  Indication: atrial fibrillation   The patient was brought to the transesophageal echo laboratory. Informed consent was obtained. The patient was seen by the anesthesiologist for MAC anesthesia.   The transesophageal probe was introduced into the posterior pharynx and esophagus without difficulty. Transesophageal images were then obtained.  The patient tolerated the procedure well.  There were no procedural complications.  The patient was transferred to the recovery area in a stable condition.  Findings: EF 45 to 50%, likely due to atrial fibrillation on KUN. Mild MR. DCCV at 150J with conversion of atrial fibrillation to Sinus Rhythm.    Cont anticoagulation  Cont BB  Add low dose ARB as BP tolerates  DC per PCP

## 2022-03-03 NOTE — PACU DISCHARGE NOTE - COMMENTS
report given to cicu verified on tele pt verbalizes understanding of post op care education emotional support provided

## 2022-03-04 ENCOUNTER — TRANSCRIPTION ENCOUNTER (OUTPATIENT)
Age: 67
End: 2022-03-04

## 2022-03-07 DIAGNOSIS — I34.0 NONRHEUMATIC MITRAL (VALVE) INSUFFICIENCY: ICD-10-CM

## 2022-03-07 DIAGNOSIS — I10 ESSENTIAL (PRIMARY) HYPERTENSION: ICD-10-CM

## 2022-03-07 DIAGNOSIS — I48.0 PAROXYSMAL ATRIAL FIBRILLATION: ICD-10-CM

## 2022-03-07 DIAGNOSIS — R42 DIZZINESS AND GIDDINESS: ICD-10-CM

## 2022-04-04 PROBLEM — I48.91 UNSPECIFIED ATRIAL FIBRILLATION: Chronic | Status: ACTIVE | Noted: 2022-03-02

## 2022-06-13 ENCOUNTER — APPOINTMENT (OUTPATIENT)
Dept: GASTROENTEROLOGY | Facility: CLINIC | Age: 67
End: 2022-06-13
Payer: COMMERCIAL

## 2022-06-13 VITALS
WEIGHT: 297 LBS | BODY MASS INDEX: 36.17 KG/M2 | DIASTOLIC BLOOD PRESSURE: 69 MMHG | HEART RATE: 61 BPM | HEIGHT: 76 IN | SYSTOLIC BLOOD PRESSURE: 150 MMHG

## 2022-06-13 DIAGNOSIS — K21.9 GASTRO-ESOPHAGEAL REFLUX DISEASE W/OUT ESOPHAGITIS: ICD-10-CM

## 2022-06-13 DIAGNOSIS — Z86.010 PERSONAL HISTORY OF COLONIC POLYPS: ICD-10-CM

## 2022-06-13 DIAGNOSIS — R13.19 OTHER DYSPHAGIA: ICD-10-CM

## 2022-06-13 DIAGNOSIS — I48.0 PAROXYSMAL ATRIAL FIBRILLATION: ICD-10-CM

## 2022-06-13 PROCEDURE — 99204 OFFICE O/P NEW MOD 45 MIN: CPT

## 2022-06-13 RX ORDER — SODIUM SULFATE, POTASSIUM SULFATE, MAGNESIUM SULFATE 17.5; 3.13; 1.6 G/ML; G/ML; G/ML
17.5-3.13-1.6 SOLUTION, CONCENTRATE ORAL
Qty: 1 | Refills: 0 | Status: ACTIVE | COMMUNITY
Start: 2022-06-13 | End: 1900-01-01

## 2022-06-14 RX ORDER — POLYETHYLENE GLYCOL 3350, SODIUM CHLORIDE, SODIUM BICARBONATE AND POTASSIUM CHLORIDE WITH LEMON FLAVOR 420; 11.2; 5.72; 1.48 G/4L; G/4L; G/4L; G/4L
420 POWDER, FOR SOLUTION ORAL
Qty: 1 | Refills: 1 | Status: ACTIVE | COMMUNITY
Start: 2022-06-14 | End: 1900-01-01

## 2022-06-19 NOTE — ASSESSMENT
[FreeTextEntry1] : 66yo male with hx colon polyps, new gerd\par \par Will check surveillance colonoscopy\par \par GERD/dysphagia - will check egd r/o stricture\par \par Risks and benefits of procedure(s) discussed with patient in detail, including but not limited to, perforation, bleeding, reaction to anesthesia, missed lesions.\par

## 2022-06-19 NOTE — HISTORY OF PRESENT ILLNESS
[de-identified] : 66yo male with hx colon polyps\par \par He has recently noticed some increased gerd and dysphagia. Some larger, bulkier solids difficult to swallow and feel like they get stuck\par \par Patient with hx colon polyps on prior colonoscopy and due for surveillance colonoscopy\par Patient is asymptomatic without bleeding or change in bowel habits\par He has hx afib

## 2022-07-26 ENCOUNTER — TRANSCRIPTION ENCOUNTER (OUTPATIENT)
Age: 67
End: 2022-07-26

## 2022-07-29 LAB — SARS-COV-2 N GENE NPH QL NAA+PROBE: NOT DETECTED

## 2022-08-02 ENCOUNTER — OUTPATIENT (OUTPATIENT)
Dept: OUTPATIENT SERVICES | Facility: HOSPITAL | Age: 67
LOS: 1 days | Discharge: HOME | End: 2022-08-02
Payer: COMMERCIAL

## 2022-08-02 ENCOUNTER — RESULT REVIEW (OUTPATIENT)
Age: 67
End: 2022-08-02

## 2022-08-02 ENCOUNTER — APPOINTMENT (OUTPATIENT)
Dept: GASTROENTEROLOGY | Facility: HOSPITAL | Age: 67
End: 2022-08-02

## 2022-08-02 VITALS
WEIGHT: 289.91 LBS | HEART RATE: 64 BPM | DIASTOLIC BLOOD PRESSURE: 84 MMHG | TEMPERATURE: 98 F | HEIGHT: 76 IN | SYSTOLIC BLOOD PRESSURE: 125 MMHG | OXYGEN SATURATION: 98 % | RESPIRATION RATE: 17 BRPM

## 2022-08-02 DIAGNOSIS — Z98.890 OTHER SPECIFIED POSTPROCEDURAL STATES: Chronic | ICD-10-CM

## 2022-08-02 DIAGNOSIS — R13.19 OTHER DYSPHAGIA: ICD-10-CM

## 2022-08-02 DIAGNOSIS — Z89.9 ACQUIRED ABSENCE OF LIMB, UNSPECIFIED: ICD-10-CM

## 2022-08-02 DIAGNOSIS — Z90.49 ACQUIRED ABSENCE OF OTHER SPECIFIED PARTS OF DIGESTIVE TRACT: Chronic | ICD-10-CM

## 2022-08-02 DIAGNOSIS — K21.9 GASTRO-ESOPHAGEAL REFLUX DISEASE WITHOUT ESOPHAGITIS: ICD-10-CM

## 2022-08-02 PROCEDURE — 88312 SPECIAL STAINS GROUP 1: CPT | Mod: 26

## 2022-08-02 PROCEDURE — 88305 TISSUE EXAM BY PATHOLOGIST: CPT

## 2022-08-02 PROCEDURE — 88305 TISSUE EXAM BY PATHOLOGIST: CPT | Mod: 26

## 2022-08-02 PROCEDURE — 88313 SPECIAL STAINS GROUP 2: CPT | Mod: 26

## 2022-08-02 PROCEDURE — 88312 SPECIAL STAINS GROUP 1: CPT

## 2022-08-02 PROCEDURE — 43239 EGD BIOPSY SINGLE/MULTIPLE: CPT

## 2022-08-02 PROCEDURE — 45385 COLONOSCOPY W/LESION REMOVAL: CPT

## 2022-08-02 PROCEDURE — 88313 SPECIAL STAINS GROUP 2: CPT

## 2022-08-02 NOTE — ASU PATIENT PROFILE, ADULT - NS TRANSFER PATIENT BELONGINGS
Clothing ring left hand; wallet/Cell Phone/PDA (specify)/Jewelry/Money (specify)/Other belongings/Clothing

## 2022-08-02 NOTE — ASU PATIENT PROFILE, ADULT - NSICDXPASTMEDICALHX_GEN_ALL_CORE_FT
PAST MEDICAL HISTORY:  Atrial fibrillation      PAST MEDICAL HISTORY:  Atrial fibrillation     Mild hypertension

## 2022-08-02 NOTE — ASU PATIENT PROFILE, ADULT - FALL HARM RISK - HARM RISK INTERVENTIONS

## 2022-08-02 NOTE — ASU PATIENT PROFILE, ADULT - FALL HARM RISK - UNIVERSAL INTERVENTIONS
Bed in lowest position, wheels locked, appropriate side rails in place/Call bell, personal items and telephone in reach/Instruct patient to call for assistance before getting out of bed or chair/Non-slip footwear when patient is out of bed/[place] to call system/Physically safe environment - no spills, clutter or unnecessary equipment/Purposeful Proactive Rounding/Room/bathroom lighting operational, light cord in reach

## 2022-08-02 NOTE — ASU PREOP CHECKLIST - ANTIBIOTIC
Pt reports feeling better. Discharge instructions and Rx discussed. Verbalizes understanding. Ambulates without difficulty to private vehicle.       Prashant Rodriguez RN  06/08/21 [id number]
n/a

## 2022-08-02 NOTE — ASU PATIENT PROFILE, ADULT - NSICDXPASTSURGICALHX_GEN_ALL_CORE_FT
PAST SURGICAL HISTORY:  H/O arthroscopy of left knee     H/O left wrist surgery     History of appendectomy     History of umbilical hernia repair     S/P right knee arthroscopy

## 2022-08-05 DIAGNOSIS — G47.33 OBSTRUCTIVE SLEEP APNEA (ADULT) (PEDIATRIC): ICD-10-CM

## 2022-08-05 DIAGNOSIS — Z87.891 PERSONAL HISTORY OF NICOTINE DEPENDENCE: ICD-10-CM

## 2022-08-05 DIAGNOSIS — M16.0 BILATERAL PRIMARY OSTEOARTHRITIS OF HIP: ICD-10-CM

## 2022-08-05 DIAGNOSIS — K44.9 DIAPHRAGMATIC HERNIA WITHOUT OBSTRUCTION OR GANGRENE: ICD-10-CM

## 2022-08-05 DIAGNOSIS — K57.30 DIVERTICULOSIS OF LARGE INTESTINE WITHOUT PERFORATION OR ABSCESS WITHOUT BLEEDING: ICD-10-CM

## 2022-08-05 DIAGNOSIS — Z86.010 PERSONAL HISTORY OF COLONIC POLYPS: ICD-10-CM

## 2022-08-05 DIAGNOSIS — Z79.01 LONG TERM (CURRENT) USE OF ANTICOAGULANTS: ICD-10-CM

## 2022-08-05 DIAGNOSIS — D12.3 BENIGN NEOPLASM OF TRANSVERSE COLON: ICD-10-CM

## 2022-08-05 DIAGNOSIS — R13.10 DYSPHAGIA, UNSPECIFIED: ICD-10-CM

## 2022-08-05 DIAGNOSIS — I48.0 PAROXYSMAL ATRIAL FIBRILLATION: ICD-10-CM

## 2022-08-05 DIAGNOSIS — I10 ESSENTIAL (PRIMARY) HYPERTENSION: ICD-10-CM

## 2022-08-05 DIAGNOSIS — Z09 ENCOUNTER FOR FOLLOW-UP EXAMINATION AFTER COMPLETED TREATMENT FOR CONDITIONS OTHER THAN MALIGNANT NEOPLASM: ICD-10-CM

## 2022-08-05 DIAGNOSIS — K64.8 OTHER HEMORRHOIDS: ICD-10-CM

## 2022-08-05 DIAGNOSIS — K21.00 GASTRO-ESOPHAGEAL REFLUX DISEASE WITH ESOPHAGITIS, WITHOUT BLEEDING: ICD-10-CM

## 2022-11-23 NOTE — PHARMACOTHERAPY INTERVENTION NOTE - INTERVENTION TYPE MED REC
Med Rec - Admission
Patient with injury to eye now has corneal abrasion on exam. Will treat with frops and refer to ophthalmology for f/u

## 2023-03-08 NOTE — ASU PREOP CHECKLIST - NSWEIGHTCALCTOOLDRUG_GEN_A_CORE
Goal Outcome Evaluation:  Plan of Care Reviewed With: patient        Progress: improving  Outcome Evaluation: Pt with good improvement in bed mobility this date completing with supervision. Continues to transfer sit to stand with CGA and require Hakeem for bed to BSC. Dependent for vivienne care. Continues to present below baseline with decreased safety awareness and endurance. Recommend d/c to SNF.    used

## 2023-10-05 PROBLEM — I10 ESSENTIAL (PRIMARY) HYPERTENSION: Chronic | Status: ACTIVE | Noted: 2022-08-02

## 2023-10-17 RX ORDER — APIXABAN 2.5 MG/1
1 TABLET, FILM COATED ORAL
Qty: 0 | Refills: 0 | DISCHARGE

## 2023-10-18 ENCOUNTER — OUTPATIENT (OUTPATIENT)
Dept: OUTPATIENT SERVICES | Facility: HOSPITAL | Age: 68
LOS: 1 days | Discharge: HOME | End: 2023-10-18
Payer: COMMERCIAL

## 2023-10-18 VITALS
WEIGHT: 289.91 LBS | TEMPERATURE: 98 F | HEART RATE: 68 BPM | RESPIRATION RATE: 16 BRPM | OXYGEN SATURATION: 99 % | SYSTOLIC BLOOD PRESSURE: 126 MMHG | DIASTOLIC BLOOD PRESSURE: 70 MMHG | HEIGHT: 76 IN

## 2023-10-18 VITALS
DIASTOLIC BLOOD PRESSURE: 70 MMHG | OXYGEN SATURATION: 98 % | HEART RATE: 56 BPM | SYSTOLIC BLOOD PRESSURE: 109 MMHG | RESPIRATION RATE: 18 BRPM

## 2023-10-18 DIAGNOSIS — Z90.49 ACQUIRED ABSENCE OF OTHER SPECIFIED PARTS OF DIGESTIVE TRACT: Chronic | ICD-10-CM

## 2023-10-18 DIAGNOSIS — Z98.890 OTHER SPECIFIED POSTPROCEDURAL STATES: Chronic | ICD-10-CM

## 2023-10-18 DIAGNOSIS — I48.92 UNSPECIFIED ATRIAL FLUTTER: ICD-10-CM

## 2023-10-18 PROCEDURE — 93320 DOPPLER ECHO COMPLETE: CPT

## 2023-10-18 PROCEDURE — 93005 ELECTROCARDIOGRAM TRACING: CPT

## 2023-10-18 PROCEDURE — 93325 DOPPLER ECHO COLOR FLOW MAPG: CPT

## 2023-10-18 PROCEDURE — 92960 CARDIOVERSION ELECTRIC EXT: CPT

## 2023-10-18 PROCEDURE — 93312 ECHO TRANSESOPHAGEAL: CPT

## 2023-10-18 PROCEDURE — 93010 ELECTROCARDIOGRAM REPORT: CPT

## 2023-10-18 RX ORDER — RIVAROXABAN 15 MG-20MG
1 KIT ORAL
Refills: 0 | DISCHARGE

## 2023-10-18 RX ORDER — DRONEDARONE 400 MG/1
1 TABLET, FILM COATED ORAL
Refills: 0 | DISCHARGE

## 2023-10-18 RX ORDER — LOSARTAN POTASSIUM 100 MG/1
1 TABLET, FILM COATED ORAL
Refills: 0 | DISCHARGE

## 2023-10-18 RX ORDER — DILTIAZEM HCL 120 MG
1 CAPSULE, EXT RELEASE 24 HR ORAL
Qty: 0 | Refills: 0 | DISCHARGE

## 2023-10-18 NOTE — PROCEDURAL SAFETY CHECKLIST WITH OR WITHOUT SEDATION - NSPOSTCOMMENTFT_GEN_ALL_CORE
Brief at 0850 Time out at 0851 Anesthesia start at 0853 Probe in at 0854 Bubble study at 0901 Probe out at 0902 CVx1 200J at 0905 NSR confirmed by dr mcmullen via 12 lead EKG. Anesthesia end at 0907. pt moved to recovery room.

## 2023-10-18 NOTE — PACU DISCHARGE NOTE - COMMENTS
pt is s/p galdino/dccv. pt is aaox4, denies pain, discomfort, palpitations, SOB, dizziness. discharge instructions reviewed w pt including medication reconciliation, follow up appts and post procedure precautions. pt verbalizes understanding of information and provides teachback. all questions answered to pt satisfaction. IVL removed 20# from LAC. pt is pending transport to [facility] to care of friend.

## 2023-10-19 NOTE — POST DISCHARGE NOTE - DETAILS:
Post procedure phone call completed; patient understood all discharge paperwork. No questions regarding medications or pain management. MD follow up appointment made. Patient was able to rest when they were discharged. Patient will recommend [facility], no complaints of hospital stay, satisfied with care. Instructed patient to contact provider with any further questions or concerns.

## 2023-10-20 DIAGNOSIS — I48.91 UNSPECIFIED ATRIAL FIBRILLATION: ICD-10-CM

## 2024-08-07 ENCOUNTER — APPOINTMENT (OUTPATIENT)
Dept: ORTHOPEDIC SURGERY | Facility: CLINIC | Age: 69
End: 2024-08-07

## 2024-08-07 PROCEDURE — 99202 OFFICE O/P NEW SF 15 MIN: CPT | Mod: 25

## 2024-08-07 PROCEDURE — 73560 X-RAY EXAM OF KNEE 1 OR 2: CPT | Mod: LT

## 2024-08-07 PROCEDURE — 20610 DRAIN/INJ JOINT/BURSA W/O US: CPT | Mod: LT

## 2024-08-08 PROBLEM — M17.12 PRIMARY OSTEOARTHRITIS OF LEFT KNEE: Status: ACTIVE | Noted: 2024-08-07

## 2024-08-08 NOTE — CONSULT LETTER
[Dear  ___] : Dear  [unfilled], [Consult Letter:] : I had the pleasure of evaluating your patient, [unfilled]. [Please see my note below.] : Please see my note below. [Consult Closing:] : Thank you very much for allowing me to participate in the care of this patient.  If you have any questions, please do not hesitate to contact me. [Sincerely,] : Sincerely, [FreeTextEntry3] : Nicholas Crystal, III, MD

## 2024-08-08 NOTE — DISCUSSION/SUMMARY
[de-identified] : At this time, due to osteoarthritis of the left knee, the patient was given a cortisone injection.  I recommend ice, elevation and reassessment in 3-4 weeks.

## 2024-08-08 NOTE — ADDENDUM
[FreeTextEntry1] : This note was written by Hetal Nowak on 08/08/2024 acting as scribe for Nicholas Crystal III, MD

## 2024-08-08 NOTE — HISTORY OF PRESENT ILLNESS
[de-identified] : The patient comes in today with complaints referable to his left knee.  He states he has been working out and developed some increasing pain and swelling.  The patient states the onset/injury occurred 01/01/2024.  This injury is not work related.  The patient states the pain is intermittent.  The patient describes the pain as sharp. [8] : a current pain level of 8/10 [de-identified] : bending knee [de-identified] : rest

## 2024-08-08 NOTE — PROCEDURE
[de-identified] : Indication: Osteoarthritis left knee   Consent: At this time, I have recommended an injection to the left knee.  The risks and benefits of the procedure were discussed with the patient in detail.  Upon verbal consent of the patient, we proceeded with the injection as noted below.   Description of Procedure: After a sterile prep, the patient underwent an injection of approximately 9 mL of 1% Lidocaine (10 mg/mL) without epinephrine and 1 mL of triamcinolone acetonide (40 mg/mL) into the left knee.  The patient tolerated the procedure well.  There were no complications.   :  [employer] Drug Name:  Triamcinolone Acetonide Injectable Suspension USP NCD#:  [address]-4 Lot#:  [id number] Expiration Date:  08/31/2025

## 2024-08-08 NOTE — PHYSICAL EXAM
[de-identified] : Right Knee: Knee: Range of Motion in Degrees	 	                  Claimant:	Normal:	 Flexion Active	  135 	                135-degrees	 Flexion Passive	  135	                135-degrees	 Extension Active	  0-5	                0-5-degrees	 Extension Passive	  0-5	                0-5-degrees	  No weakness to flexion/extension.  No evidence of instability in the AP plane or varus or valgus stress.  Negative  Lachman.  Negative pivot shift.  Negative anterior drawer test.  Negative posterior drawer test.  Negative Fatou.  Negative Apley grind.  No medial or lateral joint line tenderness.  No tenderness over the medial and lateral facet of the patella.  No patellofemoral crepitations.  No lateral tilting patella.  No patellar apprehension.  No crepitation in the medial and lateral femoral condyle.  No proximal or distal swelling, edema or tenderness.  No gross motor or sensory deficits.  No intra-articular swelling.  2+ DP and PT pulses. No varus or valgus malalignment.  Skin is intact.  No rashes, scars or lesions.     Left Knee: Knee: Range of Motion in Degrees	 	                  Claimant:	Normal:	 Flexion Active	  120 	                135-degrees	 Flexion Passive	  120	                135-degrees	 Extension Active	  10	                0-5-degrees	 Extension Passive	  10	                0-5-degrees	  No weakness to flexion/extension.  No evidence of instability in the AP plane or varus or valgus stress.  Negative  Lachman.  Negative pivot shift.  Negative anterior drawer test.  Negative posterior drawer test.  Negative Fatou.  Negative Apley grind.  No medial or lateral joint line tenderness.  Positive tenderness over the medial and lateral facet of the patella.  Positive patellofemoral crepitations.  No lateral tilting patella.  No patella apprehension.  Positive crepitation in the medial and lateral femoral condyle.  No proximal or distal swelling, edema or tenderness.  No gross motor or sensory deficits.  Mild intra-articular swelling.  2+ DP and PT pulses.  No varus or valgus malalignment.  Skin is intact.  No rashes, scars or lesions.    [de-identified] : Gait and Station:  Ambulating with a slightly antalgic to antalgic gait.  Station:  Normal.  [de-identified] : Appearance:  Well-developed, well-nourished male in no acute distress.   [de-identified] : Radiographs, one to two views of the left knee taken in the office today, show early severe arthritic changes.

## 2024-09-26 ENCOUNTER — APPOINTMENT (OUTPATIENT)
Dept: ORTHOPEDIC SURGERY | Facility: CLINIC | Age: 69
End: 2024-09-26

## 2025-06-12 ENCOUNTER — INPATIENT (INPATIENT)
Facility: HOSPITAL | Age: 70
LOS: 3 days | Discharge: HOME | DRG: 63 | End: 2025-06-16
Attending: STUDENT IN AN ORGANIZED HEALTH CARE EDUCATION/TRAINING PROGRAM | Admitting: INTERNAL MEDICINE
Payer: COMMERCIAL

## 2025-06-12 VITALS
TEMPERATURE: 98 F | WEIGHT: 297.62 LBS | HEART RATE: 110 BPM | OXYGEN SATURATION: 93 % | DIASTOLIC BLOOD PRESSURE: 99 MMHG | SYSTOLIC BLOOD PRESSURE: 161 MMHG | RESPIRATION RATE: 19 BRPM

## 2025-06-12 DIAGNOSIS — Z90.49 ACQUIRED ABSENCE OF OTHER SPECIFIED PARTS OF DIGESTIVE TRACT: Chronic | ICD-10-CM

## 2025-06-12 DIAGNOSIS — I63.9 CEREBRAL INFARCTION, UNSPECIFIED: ICD-10-CM

## 2025-06-12 DIAGNOSIS — Z98.890 OTHER SPECIFIED POSTPROCEDURAL STATES: Chronic | ICD-10-CM

## 2025-06-12 LAB
ADD ON TEST-SPECIMEN IN LAB: SIGNIFICANT CHANGE UP
ALBUMIN SERPL ELPH-MCNC: 3.9 G/DL — SIGNIFICANT CHANGE UP (ref 3.3–5)
ALP SERPL-CCNC: 44 U/L — SIGNIFICANT CHANGE UP (ref 40–120)
ALT FLD-CCNC: 33 U/L — SIGNIFICANT CHANGE UP (ref 12–78)
ANION GAP SERPL CALC-SCNC: 10 MMOL/L — SIGNIFICANT CHANGE UP (ref 5–17)
APTT BLD: 26.2 SEC — SIGNIFICANT CHANGE UP (ref 26.1–36.8)
AST SERPL-CCNC: 23 U/L — SIGNIFICANT CHANGE UP (ref 15–37)
BASOPHILS # BLD AUTO: 0.05 K/UL — SIGNIFICANT CHANGE UP (ref 0–0.2)
BASOPHILS NFR BLD AUTO: 0.5 % — SIGNIFICANT CHANGE UP (ref 0–2)
BILIRUB SERPL-MCNC: 0.3 MG/DL — SIGNIFICANT CHANGE UP (ref 0.2–1.2)
BUN SERPL-MCNC: 25 MG/DL — HIGH (ref 7–23)
CALCIUM SERPL-MCNC: 9.1 MG/DL — SIGNIFICANT CHANGE UP (ref 8.5–10.1)
CHLORIDE SERPL-SCNC: 107 MMOL/L — SIGNIFICANT CHANGE UP (ref 96–108)
CK SERPL-CCNC: 392 U/L — HIGH (ref 26–308)
CO2 SERPL-SCNC: 21 MMOL/L — LOW (ref 22–31)
CREAT SERPL-MCNC: 1.07 MG/DL — SIGNIFICANT CHANGE UP (ref 0.5–1.3)
EGFR: 76 ML/MIN/1.73M2 — SIGNIFICANT CHANGE UP
EGFR: 76 ML/MIN/1.73M2 — SIGNIFICANT CHANGE UP
EOSINOPHIL # BLD AUTO: 0.15 K/UL — SIGNIFICANT CHANGE UP (ref 0–0.5)
EOSINOPHIL NFR BLD AUTO: 1.6 % — SIGNIFICANT CHANGE UP (ref 0–6)
ETHANOL SERPL-MCNC: 10 MG/DL — SIGNIFICANT CHANGE UP (ref 0–10)
GLUCOSE SERPL-MCNC: 116 MG/DL — HIGH (ref 70–99)
HCT VFR BLD CALC: 41.5 % — SIGNIFICANT CHANGE UP (ref 39–50)
HGB BLD-MCNC: 13.6 G/DL — SIGNIFICANT CHANGE UP (ref 13–17)
IMM GRANULOCYTES # BLD AUTO: 0.03 K/UL — SIGNIFICANT CHANGE UP (ref 0–0.07)
IMM GRANULOCYTES NFR BLD AUTO: 0.3 % — SIGNIFICANT CHANGE UP (ref 0–0.9)
INR BLD: 0.95 RATIO — SIGNIFICANT CHANGE UP (ref 0.85–1.16)
LYMPHOCYTES # BLD AUTO: 3.04 K/UL — SIGNIFICANT CHANGE UP (ref 1–3.3)
LYMPHOCYTES NFR BLD AUTO: 33 % — SIGNIFICANT CHANGE UP (ref 13–44)
MCHC RBC-ENTMCNC: 28.3 PG — SIGNIFICANT CHANGE UP (ref 27–34)
MCHC RBC-ENTMCNC: 32.8 G/DL — SIGNIFICANT CHANGE UP (ref 32–36)
MCV RBC AUTO: 86.5 FL — SIGNIFICANT CHANGE UP (ref 80–100)
MONOCYTES # BLD AUTO: 0.97 K/UL — HIGH (ref 0–0.9)
MONOCYTES NFR BLD AUTO: 10.5 % — SIGNIFICANT CHANGE UP (ref 2–14)
NEUTROPHILS # BLD AUTO: 4.98 K/UL — SIGNIFICANT CHANGE UP (ref 1.8–7.4)
NEUTROPHILS NFR BLD AUTO: 54.1 % — SIGNIFICANT CHANGE UP (ref 43–77)
NRBC # BLD AUTO: 0 K/UL — SIGNIFICANT CHANGE UP (ref 0–0)
NRBC # FLD: 0 K/UL — SIGNIFICANT CHANGE UP (ref 0–0)
NRBC BLD AUTO-RTO: 0 /100 WBCS — SIGNIFICANT CHANGE UP (ref 0–0)
PLATELET # BLD AUTO: 187 K/UL — SIGNIFICANT CHANGE UP (ref 150–400)
PMV BLD: 10.1 FL — SIGNIFICANT CHANGE UP (ref 7–13)
POTASSIUM SERPL-MCNC: 3.4 MMOL/L — LOW (ref 3.5–5.3)
POTASSIUM SERPL-SCNC: 3.4 MMOL/L — LOW (ref 3.5–5.3)
PROT SERPL-MCNC: 7.3 GM/DL — SIGNIFICANT CHANGE UP (ref 6–8.3)
PROTHROM AB SERPL-ACNC: 11.2 SEC — SIGNIFICANT CHANGE UP (ref 9.9–13.4)
RBC # BLD: 4.8 M/UL — SIGNIFICANT CHANGE UP (ref 4.2–5.8)
RBC # FLD: 12.7 % — SIGNIFICANT CHANGE UP (ref 10.3–14.5)
SODIUM SERPL-SCNC: 138 MMOL/L — SIGNIFICANT CHANGE UP (ref 135–145)
TROPONIN I, HIGH SENSITIVITY RESULT: 8.18 NG/L — SIGNIFICANT CHANGE UP
WBC # BLD: 9.22 K/UL — SIGNIFICANT CHANGE UP (ref 3.8–10.5)
WBC # FLD AUTO: 9.22 K/UL — SIGNIFICANT CHANGE UP (ref 3.8–10.5)

## 2025-06-12 PROCEDURE — 84443 ASSAY THYROID STIM HORMONE: CPT

## 2025-06-12 PROCEDURE — 86618 LYME DISEASE ANTIBODY: CPT

## 2025-06-12 PROCEDURE — 97116 GAIT TRAINING THERAPY: CPT | Mod: GP

## 2025-06-12 PROCEDURE — 93306 TTE W/DOPPLER COMPLETE: CPT

## 2025-06-12 PROCEDURE — 97530 THERAPEUTIC ACTIVITIES: CPT | Mod: GO

## 2025-06-12 PROCEDURE — 87641 MR-STAPH DNA AMP PROBE: CPT

## 2025-06-12 PROCEDURE — 84100 ASSAY OF PHOSPHORUS: CPT

## 2025-06-12 PROCEDURE — 85027 COMPLETE CBC AUTOMATED: CPT

## 2025-06-12 PROCEDURE — 87476 LYME DIS DNA AMP PROBE: CPT

## 2025-06-12 PROCEDURE — 87640 STAPH A DNA AMP PROBE: CPT

## 2025-06-12 PROCEDURE — 93010 ELECTROCARDIOGRAM REPORT: CPT

## 2025-06-12 PROCEDURE — 71045 X-RAY EXAM CHEST 1 VIEW: CPT | Mod: 26

## 2025-06-12 PROCEDURE — 83735 ASSAY OF MAGNESIUM: CPT

## 2025-06-12 PROCEDURE — 80061 LIPID PANEL: CPT

## 2025-06-12 PROCEDURE — 99291 CRITICAL CARE FIRST HOUR: CPT

## 2025-06-12 PROCEDURE — 70450 CT HEAD/BRAIN W/O DYE: CPT | Mod: 26,59

## 2025-06-12 PROCEDURE — 76376 3D RENDER W/INTRP POSTPROCES: CPT

## 2025-06-12 PROCEDURE — 99284 EMERGENCY DEPT VISIT MOD MDM: CPT

## 2025-06-12 PROCEDURE — 83036 HEMOGLOBIN GLYCOSYLATED A1C: CPT

## 2025-06-12 PROCEDURE — 70498 CT ANGIOGRAPHY NECK: CPT | Mod: 26

## 2025-06-12 PROCEDURE — 86666 EHRLICHIA ANTIBODY: CPT

## 2025-06-12 PROCEDURE — 86753 PROTOZOA ANTIBODY NOS: CPT

## 2025-06-12 PROCEDURE — 0042T: CPT

## 2025-06-12 PROCEDURE — 36415 COLL VENOUS BLD VENIPUNCTURE: CPT

## 2025-06-12 PROCEDURE — 80048 BASIC METABOLIC PNL TOTAL CA: CPT

## 2025-06-12 PROCEDURE — 92610 EVALUATE SWALLOWING FUNCTION: CPT | Mod: GN

## 2025-06-12 PROCEDURE — 70496 CT ANGIOGRAPHY HEAD: CPT | Mod: 26

## 2025-06-12 PROCEDURE — 97166 OT EVAL MOD COMPLEX 45 MIN: CPT | Mod: GO

## 2025-06-12 PROCEDURE — 97161 PT EVAL LOW COMPLEX 20 MIN: CPT | Mod: GP

## 2025-06-12 PROCEDURE — 70551 MRI BRAIN STEM W/O DYE: CPT

## 2025-06-12 PROCEDURE — 93356 MYOCRD STRAIN IMG SPCKL TRCK: CPT

## 2025-06-12 PROCEDURE — 92523 SPEECH SOUND LANG COMPREHEN: CPT | Mod: GN

## 2025-06-12 PROCEDURE — 97535 SELF CARE MNGMENT TRAINING: CPT | Mod: GO

## 2025-06-12 RX ORDER — ASPIRIN 325 MG
300 TABLET ORAL DAILY
Refills: 0 | Status: DISCONTINUED | OUTPATIENT
Start: 2025-06-12 | End: 2025-06-12

## 2025-06-12 RX ORDER — ATORVASTATIN CALCIUM 80 MG/1
80 TABLET, FILM COATED ORAL AT BEDTIME
Refills: 0 | Status: DISCONTINUED | OUTPATIENT
Start: 2025-06-12 | End: 2025-06-16

## 2025-06-12 RX ORDER — TENECTEPLASE 50 MG
25 KIT INTRAVENOUS ONCE
Refills: 0 | Status: COMPLETED | OUTPATIENT
Start: 2025-06-12 | End: 2025-06-12

## 2025-06-12 RX ADMIN — Medication 10 MILLILITER(S): at 17:56

## 2025-06-12 RX ADMIN — Medication 10 MILLILITER(S): at 17:53

## 2025-06-12 RX ADMIN — Medication 100 MILLIEQUIVALENT(S): at 23:57

## 2025-06-12 RX ADMIN — TENECTEPLASE 3600 MILLIGRAM(S): KIT at 17:54

## 2025-06-12 RX ADMIN — Medication 100 MILLIEQUIVALENT(S): at 21:01

## 2025-06-12 RX ADMIN — Medication 500 MILLILITER(S): at 17:42

## 2025-06-12 RX ADMIN — Medication 75 MILLILITER(S): at 21:01

## 2025-06-12 RX ADMIN — Medication 100 MILLIEQUIVALENT(S): at 22:03

## 2025-06-12 NOTE — STROKE CODE NOTE - NSMDCONSULT QTN_Y FT
CC 70y old  Male who presents with a chief complaint of      HPI:Slurred speech and facial droop.    HPI:   62-year-old right-handed male with PMHx of HTN, PAF, not on AC, GERD, low back pains, presented to [facility] ED with complaints of acute onset slurred speech preceded by left hand clumsiness and weird sensation.  Patient was at a golf outing with friends, around 16.30 PM, he bent down to  something and felt his left hand felt weird and incoordinated, had difficulty grasping, he called his wife at 16:50 PM, was noted to have slurring of speech hence came to ED.  In ED NIHSS 3 for dense left facial droop and dysarthria, CT scan head revealed no acute findings, patient was within the 3-hour window.  For thrombolytic therapy, given brief episode of left hand incoordination that resolved and current NIH score 3, after discussing with the patient and his wife it was decided to proceed with TNK, weight-based TNK was administered at 1754 hrs.      PAST MEDICAL & SURGICAL HISTORY:  Atrial fibrillation  Mild hypertension  History of appendectomy  History of umbilical hernia repair  H/O left wrist surgery  H/O arthroscopy of left knee  S/P right knee arthroscopy        FAMILY HISTORY:  No pertinent family history in first degree relatives      Social Hx:  Nonsmoker, no drug or alcohol use      MEDICATIONS  (STANDING):  tenecteplase 50 milliGRAM(s) Injectable (Rx Quick Charge). 25 milliGRAM(s) Waste        Allergies    No Known Allergies    Intolerances      ROS: Pertinent positives in HPI, all other ROS were reviewed and are negative.        Vital Signs Last 24 Hrs  T(C): 36.8 (12 Jun 2025 17:19), Max: 36.8 (12 Jun 2025 17:19)  T(F): 98.2 (12 Jun 2025 17:19), Max: 98.2 (12 Jun 2025 17:19)  HR: 110 (12 Jun 2025 17:19) (110 - 110)  BP: 161/99 (12 Jun 2025 17:19) (161/99 - 161/99)  BP(mean): 115 (12 Jun 2025 17:19) (115 - 115)  RR: 19 (12 Jun 2025 17:19) (19 - 19)  SpO2: 93% (12 Jun 2025 17:19) (93% - 93%)    Parameters below as of 12 Jun 2025 17:19  Patient On (Oxygen Delivery Method): room air      Gen exam:  Normocephalic, in no distress, alert, awake .  HEENT: PERRLA, EOMI,   Neck: Supple.  Respiratory: Breath sounds are clear bilaterally  Cardiovascular: S1 and S2, regular   Extremities:  no edema  Vascular: Caritid Bruit - no  Musculoskeletal: no abnormal movements  Skin: No rashes      Neurological exam:  HF: A x O x 3. Appropriately interactive, normal affect. Speech Dysarthria, No Aphasia or paraphasic errors. Naming /repetition intact   CN: DANIELLE, EOMI, VFF, facial sensation normal, severe left NLFD, tongue midline, Palate moves equally, SCM equal bilaterally  Motor: No pronator drift, Strength 5/5 in all 4 ext    Sens: Intact to light touch / PP    Reflexes: Symmetric and normal downgoing toes b/l  Coord:  No FNFA,   Gait/Balance: Cannot test    NIHSS: 3          Labs:   06-12    138  |  107  |  25[H]  ----------------------------<  116[H]  3.4[L]   |  21[L]  |  1.07    Ca    9.1      12 Jun 2025 17:22    TPro  7.3  /  Alb  3.9  /  TBili  0.3  /  DBili  x   /  AST  23  /  ALT  33  /  AlkPhos  44  06-12                            13.6   9.22  )-----------( 187      ( 12 Jun 2025 17:22 )             41.5       Radiology:  < from: CT Brain Perfusion Maps Stroke (06.12.25 @ 17:40) >    IMPRESSION:    CT head:  No acute abnormality.    Critical value:  I discussed the finding of this report with Dr. Galeas   at 5:43 PM on 6/12/2025.  Critical value policy of the hospital was   followed.  Read back and confirmation of receipt of this communication   was performed.  This verbal communication supplements the text report of this document.    CTA brain: No hemodynamically significant stenosis    CTA carotid/vertebral artery circulation: No hemodynamically significant   stenosis    CT Perfusion: Scattered areas of delayed flow within the bilateral   cerebri and bilateral cerebelli, likely reflecting artifact given   different vascular distribution, and lack of thrombosis or stenosis on the CTA.      A&p:  62-year-old male with PMHx of HTN, PAF–not on AC, presented to [facility] ED at 1715 hrs. with complaints of slurred speech, left facial droop, preceded by left hand clumsiness and incoordination that started at 1630 hrs. after playing golf.  Code stroke evaluation NIHSS 3,In ED NIHSS 3 for dense left facial droop and dysarthria, CT scan head - no acute findings, patient was within the 3-hour window.  For thrombolytic therapy, given brief episode of left hand incoordination that resolved and current NIH score 3, after discussing with the patient and his wife it was decided to proceed with TNK, weight-based TNK was administered at 1754 hrs. bp 145/92    Recommendations:  -Admit to ICU, monitor BP/neuro checks per protocol  -No ASA/PLAVIX x 24 hours  -Atorvastatin 80 mg  -MRI brain  -TTE with bubble   -AIC, lipid profile  -DVT prophylaxis  -Dysphagia screen  -Speech and swallow eval  -PT eval/ rehab eval    Management d/w Pt / his wife and Dr. Galeas

## 2025-06-12 NOTE — H&P ADULT - NSHPLABSRESULTS_GEN_ALL_CORE
13.6   9.22  )-----------( 187      ( 12 Jun 2025 17:22 )             41.5     06-12    138  |  107  |  25[H]  ----------------------------<  116[H]  3.4[L]   |  21[L]  |  1.07    Ca    9.1      12 Jun 2025 17:22    TPro  7.3  /  Alb  3.9  /  TBili  0.3  /  DBili  x   /  AST  23  /  ALT  33  /  AlkPhos  44  06-12        LIVER FUNCTIONS - ( 12 Jun 2025 17:22 )  Alb: 3.9 g/dL / Pro: 7.3 gm/dL / ALK PHOS: 44 U/L / ALT: 33 U/L / AST: 23 U/L / GGT: x           PT/INR - ( 12 Jun 2025 17:22 )   PT: 11.2 sec;   INR: 0.95 ratio         PTT - ( 12 Jun 2025 17:22 )  PTT:26.2 sec  Urinalysis Basic - ( 12 Jun 2025 17:22 )    Color: x / Appearance: x / SG: x / pH: x  Gluc: 116 mg/dL / Ketone: x  / Bili: x / Urobili: x   Blood: x / Protein: x / Nitrite: x   Leuk Esterase: x / RBC: x / WBC x   Sq Epi: x / Non Sq Epi: x / Bacteria: x    < from: CT Brain Stroke Protocol (06.12.25 @ 17:32) >    IMPRESSION:    CT head:  No acute abnormality.    CTA brain: No hemodynamically significant stenosis    CTA carotid/vertebral artery circulation: No hemodynamically significant   stenosis    CT Perfusion: Scattered areas of delayed flow within the bilateral   cerebri and bilateral cerebelli, likely reflecting artifact given   different vascular distribution, and lack of thrombosis or stenosis on   the CTA.    < end of copied text >

## 2025-06-12 NOTE — ED ADULT TRIAGE NOTE - CHIEF COMPLAINT QUOTE
Pt presents to [facility] complaining of code stroke. Pt went to lunch with a friend and had 4 beers. 2 hours after 1 hour prior to arrival Pt began to have slurred speech. Slight L facial droop noted. Code stroke called by MD Galeas in triage.  in triage.

## 2025-06-12 NOTE — ED ADULT NURSE REASSESSMENT NOTE - NS ED NURSE REASSESS COMMENT FT1
Report received from YOVANA Forte Pt resting comfortably in bed, NIH 3, GCS 15 v/s stable denies any pain at this time. Report to be given to ICU.

## 2025-06-12 NOTE — PATIENT PROFILE ADULT - NSTRANSFERBELONGINGSRESP_GEN_A_NUR
cellphone and  at bedside  eyeglasses on patient  wedding band on patient   shorts and sandals at bedside/yes

## 2025-06-12 NOTE — H&P ADULT - ASSESSMENT
ASSESSMENT  71yo male with PMHx HTN, pAfib (stopped taking eliquis last year- noncompliant), presents for LUE weakness and slurred speech. 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 At that time his friend also noticed slurred speech. He believes it happened about 4:15-4:30pm. He states he has a history of Afib but has been in normal rhythm since last year, he chose to stop eliquis on his own. Denies chest pain, palpitation, HA, sob, abd pain, nausea. LE weakness    Admitted for   1. Slurred speech, LUE weakness - eval for CVA  2.Hypokalemia    Code Stroke called in ED  CTH negative, CTA brain no hemodynamically significant stenosis. CT perfusion with scattered areas of delayed flow likely artifact  given TNK at 17:54      PLAN    Neuro: AAOx 3. s/p TNK 17:54. neuro checks q1hr. order MRI. neuro consult.   CV: /80s. maintain BP < 180/ <105. resume home anti-htn meds (losartan), add statin. check TTE, cardio consult  Pulm: on room air   GI: bedside dysphagia screen, then PO diet if passes. bowel regimen prn  Nephro: Hypokalemia - replete K. monitor I & Os. Trend renal fxn  Endo: check a1c and lipid panel   ID: no abx indicated at this time.   Heme: Hgb stable. hold chemical DVT ppx as pt received TNK. SCDs  PT eval/OT eval    Dispo: ICU. neuro checks. BP control. statin, MRI. TTE. neuro consult.     discussed with Dr. Cowart    updated wife Anjana at bedside

## 2025-06-12 NOTE — ED PROVIDER NOTE - OBJECTIVE STATEMENT
70-year-old WM, PMH: HTN, pA-fib (not on AC med), GERD, chronic LBP; BIP private car complaining of acute onset slurred speech noted an hour PTA.  Earlier today patient played golf with friends, afterwards had lunch including 3-4 light beers without any effect including speech abnormality, subsequently doing outside yard work with friend in the direct hot sunlight.  Patient had acknowledged was sweaty and felt hot.  Noted an hour ago some difficulty grasping objects with right hand and he and friend noted patient with new slurring of speech.  Patient denies headache, vision/swallow changes, current focal extremity/face weakness, gait abnormality, dizziness, chest pain, SOB, F/C, nor N/V.  No LOC.  BGM at ED triage 123.  Code Stroke initiated upon patient arrival. 70-year-old WM, PMH: HTN, pA-fib (not on AC med), GERD, chronic LBP; BIP private car complaining of acute onset slurred speech noted an hour PTA.  Earlier today patient played golf with friends, afterwards had lunch including 3-4 light beers without any effect including speech abnormality, subsequently doing outside yard work with friend in the direct hot sunlight.  Patient had acknowledged was sweaty and felt hot.  Noted an hour ago some difficulty grasping objects with L hand and he and friend noted patient with new slurring of speech.  Patient denies headache, vision/swallow changes, current focal extremity/face weakness, gait abnormality, dizziness, chest pain, SOB, F/C, nor N/V.  No LOC.  BGM at ED triage 123.  Code Stroke initiated upon patient arrival. 70-year-old WM, PMH: HTN, pA-Fib (not on AC med), GERD, chronic LBP; BIB private car complaining of acute onset slurred speech noted 1 hour PTA.  Earlier today patient played golf with friends w/o any problem, afterwards had lunch including 3-4 light beers without any effect including any speech abnormality, subsequently doing outside yard work with friend in the direct hot sunlight.  Patient acknowledges was sweaty and felt hot.  Noted an hour ago some difficulty grasping objects with L hand and he and friend noted patient with new slurring of speech.  Patient denies headache, vision/swallow changes, current focal extremity/face weakness, gait abnormality, dizziness, chest pain, SOB, F/C, nor N/V.  No LOC.  BGM at ED triage 123.  Code Stroke initiated upon patient arrival.

## 2025-06-12 NOTE — ED PROVIDER NOTE - PROGRESS NOTE DETAILS
CC:  Dr. Quijano/Neurology at CT w/ pt. CC;  Dr. Quijano at bedside. CC:  CT head non-con verbal report; no acute abnl. CC:  Decision for TNK made at 17:49 by Dr. Quijano & I.  No pharmacist available.  TNK administered by me at 17:54. CC:  ICU PA aware of eval/admit re: suspected acute CVA s/p thrombolytic. CC:  ICU admit accepted by Dr. Cowart. CC:  ICU PA aware of eval/admit re: suspected acute CVA s/p thrombolytic administered.

## 2025-06-12 NOTE — PATIENT PROFILE ADULT - FALL HARM RISK - RISK INTERVENTIONS

## 2025-06-12 NOTE — ED PROVIDER NOTE - CLINICAL SUMMARY MEDICAL DECISION MAKING FREE TEXT BOX
Clinical concern incl: CVA, TIA, heat exhaustion.    Plan: Code Stroke initiated at triage: Stroke CT studies, , stroke labs, EKG, chest x-ray, NIHSS, dysphagia screen, Neurology consult.  IVF.  Monitor observe reassess. 70-year-old WM, PMH: HTN, pA-Fib (not on AC med), GERD, chronic LBP; BIB private car complaining of acute onset slurred speech noted 1 hour PTA noted after doing some yard work outside in hot direct sun exposure.   Exam: + Dysarthria, ? L face droop. BGM normal.     Clinical concern incl: CVA, TIA, heat exhaustion.  Plan: Code Stroke initiated at Triage: Stroke CT studies, , stroke labs, EKG, chest x-ray, NIHSS, dysphagia screen, Neurology consult.  IVF.  Monitor observe reassess.    See Progress Notes for case progression incl. CT reports, Neurology consult, labs review, TNK administration & ICU eval/admission.

## 2025-06-12 NOTE — ED PROVIDER NOTE - CARE PLAN
1 Principal Discharge DX:	Acute CVA (cerebrovascular accident)  Secondary Diagnosis:	Facial droop  Secondary Diagnosis:	Dysarthria

## 2025-06-12 NOTE — H&P ADULT - HISTORY OF PRESENT ILLNESS
HPI: 69yo male with PMHx HTN, pAfib (stopped taking eliquis last year- noncompliant), presents for LUE weakness and slurred speech. 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 At that time his friend also noticed slurred speech. He believes it happened about 4:15-4:30pm. He states he has a history of Afib but has been in normal rhythm since last year, he chose to stop eliquis on his own. Denies chest pain, palpitation, HA, sob, abd pain, nausea. LE weakness    Code Stroke called in ED  CTH negative  given TNK at 17:54    In ED, pt still with slurred speech, L sided facial droop and mild LUE weakness with  strength. /80, in sinus rhythm

## 2025-06-12 NOTE — H&P ADULT - NS ATTEND AMEND GEN_ALL_CORE FT
ASSESSMENT  69yo male with PMHx HTN, pAfib (stopped taking eliquis last year- noncompliant), presents for LUE weakness and slurred speech. 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 At that time his friend also noticed slurred speech. He believes it happened about 4:15-4:30pm. He states he has a history of Afib but has been in normal rhythm since last year, he chose to stop eliquis on his own. Denies chest pain, palpitation, HA, sob, abd pain, nausea. LE weakness    Admitted for   1. Slurred speech, LUE weakness - eval for CVA  2.Hypokalemia    Code Stroke called in ED  CTH negative, CTA brain no hemodynamically significant stenosis. CT perfusion with scattered areas of delayed flow likely artifact  given TNK at 17:54        Plan:  ICU  S/P TNK  lipids, A1c, PT,OT, Swallow eval  NPO  NS at 75  MRI  Neuro following  neuro checks  Venodynes

## 2025-06-12 NOTE — ED PROVIDER NOTE - PHYSICAL EXAMINATION
Gen'l:  Older WM adult in NAD, no respiratory discomfort, no sentence shortening, not acutely ill.  Head: NC/AT  Eyes: PERRL, EOMI  ENT: O/P clear, mm slt. dry  CV: RRR, normal radial pulse  Lungs: CTA, normal respirations  GI: soft, NT, BS+  : Deferred, no flank nor CVAT  Neck: NT, supple w/o pain  MSK: DANIEL x 4, no focal extremity swelling nor tenderness, B/L SLR 35 degrees w/o pain, normal motor  Skin: no tactile warmth, no rash  Neuro: A+O x 4, CNs + L lower face droop, speech mildly slurred, no aphasia, no focal motor/sensory deficits, FTN normal Gen'l:  Older WM adult in NAD, no respiratory discomfort, no sentence shortening, not acutely ill.  Head: NC/AT  Eyes: PERRL, EOMI  ENT: O/P clear, mm slt. dry  CV: RRR, normal radial pulse  Lungs: CTA, normal respirations  GI: soft, NT, BS+  : Deferred, no flank nor CVAT  Neck: NT, supple w/o pain  MSK: DANIEL x 4, no focal extremity swelling nor tenderness, B/L SLR 35 degrees w/o pain, normal motor  Skin: no tactile warmth, no rash  Neuro: A+O x 4, CNs + L lower face droop, speech mildly slurred, no aphasia, no focal motor/sensory deficits of extremities, FTN normal. NIHSS = 3.

## 2025-06-12 NOTE — ED PROVIDER NOTE - CRITICAL CARE ATTENDING CONTRIBUTION TO CARE
Thrombolytic administration in initial medical treatment for acute CVA.    Attending Contribution to Care: Due to a high probability of clinically significant, life threatening deterioration, the patient required my highest level of preparedness to intervene emergently and I personally spent this critical care time directly and personally managing the patient. This critical care time included obtaining a history; examining the patient; pulse oximetry; ordering and review of studies; arranging urgent treatment with development of a management plan; evaluation of patient's response to treatment; frequent reassessment; and, discussions with other providers. This critical care time was performed to assess and manage the high probability of imminent, life-threatening deterioration that could result in multi-organ failure. It was exclusive of separately billable procedures and treating other patients and teaching time.

## 2025-06-12 NOTE — H&P ADULT - NSHPPHYSICALEXAM_GEN_ALL_CORE
GEN: sitting up in stretcher NAD  HEENT: L sided facial droop,   CV:  +S1, +S2, RRR  RESP:   lungs clear to auscultation bilaterally, no wheeze,   GI:  abdomen soft, non-tender, non-distended,   EXT:  no LE edema  NEURO:  AAOX3, L sided facial droop, slurred speech, no dysmetria, neg pronator drift  SKIN:  no rashes

## 2025-06-12 NOTE — ED ADULT NURSE NOTE - OBJECTIVE STATEMENT
pt bib friend c/o slurred speech and difficulty grasping objects with L hand  starting at approx 1630 today. pt states he had 4 beers after golfing this afternoon. Patient denies headache, vision/swallow changes, gait abnormality, dizziness, chest pain, SOB. -LOC. pmh HTN, afib (not on blood  thinners), GERD

## 2025-06-12 NOTE — ED ADULT NURSE NOTE - CHIEF COMPLAINT QUOTE
Pt presents to [facility] complaining of code stroke. Pt went to lunch with a friend and had 4 beers. 2 hours after 1 hour prior to arrival Pt began to have slurred speech. Slight L facial droop noted. Code stroke called by MD Galeas in triage.  in triage.

## 2025-06-13 ENCOUNTER — TRANSCRIPTION ENCOUNTER (OUTPATIENT)
Age: 70
End: 2025-06-13

## 2025-06-13 ENCOUNTER — RESULT REVIEW (OUTPATIENT)
Age: 70
End: 2025-06-13

## 2025-06-13 LAB
A1C WITH ESTIMATED AVERAGE GLUCOSE RESULT: 5.7 % — HIGH (ref 4–5.6)
ANION GAP SERPL CALC-SCNC: 5 MMOL/L — SIGNIFICANT CHANGE UP (ref 5–17)
BUN SERPL-MCNC: 17 MG/DL — SIGNIFICANT CHANGE UP (ref 7–23)
CALCIUM SERPL-MCNC: 8.9 MG/DL — SIGNIFICANT CHANGE UP (ref 8.5–10.1)
CHLORIDE SERPL-SCNC: 109 MMOL/L — HIGH (ref 96–108)
CHOLEST SERPL-MCNC: 204 MG/DL — HIGH
CO2 SERPL-SCNC: 25 MMOL/L — SIGNIFICANT CHANGE UP (ref 22–31)
CREAT SERPL-MCNC: 0.76 MG/DL — SIGNIFICANT CHANGE UP (ref 0.5–1.3)
EGFR: 98 ML/MIN/1.73M2 — SIGNIFICANT CHANGE UP
EGFR: 98 ML/MIN/1.73M2 — SIGNIFICANT CHANGE UP
ESTIMATED AVERAGE GLUCOSE: 117 MG/DL — HIGH (ref 68–114)
GLUCOSE SERPL-MCNC: 94 MG/DL — SIGNIFICANT CHANGE UP (ref 70–99)
HCT VFR BLD CALC: 39.4 % — SIGNIFICANT CHANGE UP (ref 39–50)
HDLC SERPL-MCNC: 56 MG/DL — SIGNIFICANT CHANGE UP
HGB BLD-MCNC: 13.2 G/DL — SIGNIFICANT CHANGE UP (ref 13–17)
LDLC SERPL-MCNC: 137 MG/DL — HIGH
LIPID PNL WITH DIRECT LDL SERPL: 137 MG/DL — HIGH
MAGNESIUM SERPL-MCNC: 2.1 MG/DL — SIGNIFICANT CHANGE UP (ref 1.6–2.6)
MCHC RBC-ENTMCNC: 28.8 PG — SIGNIFICANT CHANGE UP (ref 27–34)
MCHC RBC-ENTMCNC: 33.5 G/DL — SIGNIFICANT CHANGE UP (ref 32–36)
MCV RBC AUTO: 86 FL — SIGNIFICANT CHANGE UP (ref 80–100)
MRSA PCR RESULT.: SIGNIFICANT CHANGE UP
NONHDLC SERPL-MCNC: 148 MG/DL — HIGH
NRBC # BLD AUTO: 0 K/UL — SIGNIFICANT CHANGE UP (ref 0–0)
NRBC # FLD: 0 K/UL — SIGNIFICANT CHANGE UP (ref 0–0)
NRBC BLD AUTO-RTO: 0 /100 WBCS — SIGNIFICANT CHANGE UP (ref 0–0)
PHOSPHATE SERPL-MCNC: 2.6 MG/DL — SIGNIFICANT CHANGE UP (ref 2.5–4.5)
PLATELET # BLD AUTO: 169 K/UL — SIGNIFICANT CHANGE UP (ref 150–400)
PMV BLD: 10.3 FL — SIGNIFICANT CHANGE UP (ref 7–13)
POTASSIUM SERPL-MCNC: 4 MMOL/L — SIGNIFICANT CHANGE UP (ref 3.5–5.3)
POTASSIUM SERPL-SCNC: 4 MMOL/L — SIGNIFICANT CHANGE UP (ref 3.5–5.3)
RBC # BLD: 4.58 M/UL — SIGNIFICANT CHANGE UP (ref 4.2–5.8)
RBC # FLD: 12.8 % — SIGNIFICANT CHANGE UP (ref 10.3–14.5)
S AUREUS DNA NOSE QL NAA+PROBE: SIGNIFICANT CHANGE UP
SODIUM SERPL-SCNC: 139 MMOL/L — SIGNIFICANT CHANGE UP (ref 135–145)
TRIGL SERPL-MCNC: 63 MG/DL — SIGNIFICANT CHANGE UP
TSH SERPL-MCNC: 2.48 UU/ML — SIGNIFICANT CHANGE UP (ref 0.34–4.82)
WBC # BLD: 6.51 K/UL — SIGNIFICANT CHANGE UP (ref 3.8–10.5)
WBC # FLD AUTO: 6.51 K/UL — SIGNIFICANT CHANGE UP (ref 3.8–10.5)

## 2025-06-13 PROCEDURE — 93306 TTE W/DOPPLER COMPLETE: CPT | Mod: 26

## 2025-06-13 PROCEDURE — 93356 MYOCRD STRAIN IMG SPCKL TRCK: CPT

## 2025-06-13 PROCEDURE — 76376 3D RENDER W/INTRP POSTPROCES: CPT | Mod: 26

## 2025-06-13 PROCEDURE — 99233 SBSQ HOSP IP/OBS HIGH 50: CPT

## 2025-06-13 PROCEDURE — 70551 MRI BRAIN STEM W/O DYE: CPT | Mod: 26

## 2025-06-13 PROCEDURE — 99232 SBSQ HOSP IP/OBS MODERATE 35: CPT

## 2025-06-13 RX ORDER — APIXABAN 2.5 MG/1
1 TABLET, FILM COATED ORAL
Qty: 60 | Refills: 1
Start: 2025-06-13 | End: 2025-08-11

## 2025-06-13 RX ORDER — APIXABAN 2.5 MG/1
5 TABLET, FILM COATED ORAL
Refills: 0 | Status: DISCONTINUED | OUTPATIENT
Start: 2025-06-13 | End: 2025-06-13

## 2025-06-13 RX ORDER — APIXABAN 2.5 MG/1
5 TABLET, FILM COATED ORAL
Refills: 0 | Status: DISCONTINUED | OUTPATIENT
Start: 2025-06-13 | End: 2025-06-16

## 2025-06-13 RX ADMIN — ATORVASTATIN CALCIUM 80 MILLIGRAM(S): 80 TABLET, FILM COATED ORAL at 22:04

## 2025-06-13 RX ADMIN — APIXABAN 5 MILLIGRAM(S): 2.5 TABLET, FILM COATED ORAL at 22:04

## 2025-06-13 RX ADMIN — Medication 75 MILLILITER(S): at 10:02

## 2025-06-13 NOTE — OCCUPATIONAL THERAPY INITIAL EVALUATION ADULT - PERTINENT HX OF CURRENT PROBLEM, REHAB EVAL
69 y/o M, PMHx HTN, pAfib (stopped taking eliquis last year- noncompliant), presents for LUE weakness and slurred speech. 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 At that time his friend also noticed slurred speech. He believes it happened about 4:15-4:30pm. He states he has a history of Afib but has been in normal rhythm since last year, he chose to stop eliquis on his own. Denies chest pain, palpitation, HA, sob, abd pain, nausea. LE weakness. CTH negative, CTA brain no hemodynamically significant stenosis. CT perfusion with scattered areas of delayed flow likely artifact  given TNK.

## 2025-06-13 NOTE — PROGRESS NOTE ADULT - ASSESSMENT
62-year-old male with PMHx of HTN, PAF–not on AC, presented to [facility] ED at 1715 hrs. with complaints of slurred speech, left facial droop, preceded by left hand clumsiness and incoordination that started at 1630 hrs. after playing golf.  Code stroke evaluation NIHSS 3,In ED NIHSS 3 for dense left facial droop and dysarthria, CT scan head - no acute findings, patient was within the 3-hour window for thrombolytic therapy, given brief episode of left hand incoordination that resolved and current NIH score 3, after discussing with the patient and his wife it was decided to proceed with TNK, weight-based TNK was administered at 1754 hrs. bp 145/92    # Acute right  Perirolandic infarct, status post TNK, remarkable improvement, NIHSS 3 >–1.    #Hyperlipidemia.    # A-fib    – At 24 hours start aspirin 81 Mg and clopidogrel 75 MGs X 3 weeks  – Follow-up with cardio, if patient willing to start DOAC, then only DOAC and no DAPT (patient had not been taking DOAC for A-fib previously)  – High-dose atorvastatin, LDL goal less than 70, current 137  – Follow-up with TTE with bubble results  – PT/SLP    Above plan discussed with patient and with Dr. Diamond Clup Is on service from 6/14, he will follow-up

## 2025-06-13 NOTE — SWALLOW BEDSIDE ASSESSMENT ADULT - NS SPL SWALLOW CLINIC TRIAL FT
The pt exhibited Oropharyngeal Swallowing integrity which subjectively appeared to be within functional parameters for age. No behavioral aspiration signs exhibited. No change in O2 sats noted. Odynophagia denied.

## 2025-06-13 NOTE — SWALLOW BEDSIDE ASSESSMENT ADULT - SLP GENERAL OBSERVATIONS
The pt is alert and interactive. He stated that yesterday his speech became acutely slurred but is now "much better". The pt followed multi step commands and was able to verbalize during communicative probes. At these times, pt's motor speech integrity was felt to be functional, his speech output is intelligible and his verbalizations were linguistically intact/contextually appropriate. The pt is communicatively competent.

## 2025-06-13 NOTE — PROGRESS NOTE ADULT - SUBJECTIVE AND OBJECTIVE BOX
ROS:     MEDICATIONS  (STANDING):  atorvastatin 80 milliGRAM(s) Oral at bedtime  sodium chloride 0.9%. 1000 milliLiter(s) (75 mL/Hr) IV Continuous <Continuous>      Vital Signs Last 24 Hrs  T(C): 36.9 (13 Jun 2025 06:00), Max: 36.9 (13 Jun 2025 06:00)  T(F): 98.5 (13 Jun 2025 06:00), Max: 98.5 (13 Jun 2025 06:00)  HR: 63 (13 Jun 2025 07:00) (58 - 110)  BP: 115/86 (13 Jun 2025 07:00) (80/68 - 167/85)  BP(mean): 96 (13 Jun 2025 07:00) (73 - 115)  RR: 19 (13 Jun 2025 07:00) (12 - 23)  SpO2: 95% (13 Jun 2025 07:00) (93% - 99%)    Parameters below as of 13 Jun 2025 07:00  Patient On (Oxygen Delivery Method): room air                              13.2   6.51  )-----------( 169      ( 13 Jun 2025 05:30 )             39.4     06-13    139  |  109[H]  |  17  ----------------------------<  94  4.0   |  25  |  0.76    Ca    8.9      13 Jun 2025 05:30  Phos  2.6     06-13  Mg     2.1     06-13    TPro  7.3  /  Alb  3.9  /  TBili  0.3  /  DBili  x   /  AST  23  /  ALT  33  /  AlkPhos  44  06-12          Radiology report:  < from: CT Brain Perfusion Maps Stroke (06.12.25 @ 17:40) >  IMPRESSION:    CT head:  No acute abnormality.      Critical value:  I discussed the finding of this report with Dr. Galeas   at 5:43 PM on 6/12/2025.  Critical value policy of the hospital was   followed.  Read back and confirmation of receipt of this communication   was performed.  This verbal communication supplements the text report of   this document.    CTA brain: No hemodynamically significant stenosis    CTA carotid/vertebral artery circulation: No hemodynamically significant   stenosis    CT Perfusion: Scattered areas of delayed flow within the bilateral   cerebri and bilateral cerebelli, likely reflecting artifact given   different vascular distribution, and lack of thrombosis or stenosis on   the CTA.       No acute events overnight, no new complaints. L facial droop and dysarthria is resolved.      ROS: As stated above, otherwise neg.    MEDICATIONS  (STANDING):  atorvastatin 80 milliGRAM(s) Oral at bedtime  sodium chloride 0.9%. 1000 milliLiter(s) (75 mL/Hr) IV Continuous <Continuous>      Vital Signs Last 24 Hrs  T(C): 36.9 (13 Jun 2025 06:00), Max: 36.9 (13 Jun 2025 06:00)  T(F): 98.5 (13 Jun 2025 06:00), Max: 98.5 (13 Jun 2025 06:00)  HR: 63 (13 Jun 2025 07:00) (58 - 110)  BP: 115/86 (13 Jun 2025 07:00) (80/68 - 167/85)  BP(mean): 96 (13 Jun 2025 07:00) (73 - 115)  RR: 19 (13 Jun 2025 07:00) (12 - 23)  SpO2: 95% (13 Jun 2025 07:00) (93% - 99%)    Parameters below as of 13 Jun 2025 07:00  Patient On (Oxygen Delivery Method): room air      Neurological exam:  HF: A x O x 3. Appropriately interactive, normal affect. Speech fluent, No Aphasia or paraphasic errors. Naming /repetition intact   CN: DANIELLE, EOMI, VFF, facial sensation normal, no NLFD, tongue midline, Palate moves equally  Motor: No pronator drift, Strength 5/5 in all 4 ext, normal bulk and tone, no tremors, rigidity  Sens: Intact to light touch    Reflexes: Symmetric and normal, downgoing toes b/l  Coord:  No FNFA, dysmetria, HIMANSHU intact   Gait/Balance: Cannot test    NIHSS: 0                          13.2   6.51  )-----------( 169      ( 13 Jun 2025 05:30 )             39.4     06-13    139  |  109[H]  |  17  ----------------------------<  94  4.0   |  25  |  0.76    Ca    8.9      13 Jun 2025 05:30  Phos  2.6     06-13  Mg     2.1     06-13    TPro  7.3  /  Alb  3.9  /  TBili  0.3  /  DBili  x   /  AST  23  /  ALT  33  /  AlkPhos  44  06-12  A1c 5.7        Radiology report:  < from: CT Brain Perfusion Maps Stroke (06.12.25 @ 17:40) >  IMPRESSION:    CT head:  No acute abnormality.      Critical value:  I discussed the finding of this report with Dr. Galeas   at 5:43 PM on 6/12/2025.  Critical value policy of the hospital was   followed.  Read back and confirmation of receipt of this communication   was performed.  This verbal communication supplements the text report of   this document.    CTA brain: No hemodynamically significant stenosis    CTA carotid/vertebral artery circulation: No hemodynamically significant   stenosis    CT Perfusion: Scattered areas of delayed flow within the bilateral   cerebri and bilateral cerebelli, likely reflecting artifact given   different vascular distribution, and lack of thrombosis or stenosis on   the CTA.

## 2025-06-13 NOTE — PHYSICAL THERAPY INITIAL EVALUATION ADULT - PERTINENT HX OF CURRENT PROBLEM, REHAB EVAL
69yo male with PMHx HTN, pAfib (stopped taking eliquis last year- noncompliant), presents for LUE weakness and slurred speech. 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 At that time his friend also noticed slurred speech. He believes it happened about 4:15-4:30pm. He states he has a history of Afib but has been in normal rhythm since last year, he chose to stop eliquis on his own. [facility] neg. TNK admin at 1754. plan is for MRI today.

## 2025-06-13 NOTE — DISCHARGE NOTE NURSING/CASE MANAGEMENT/SOCIAL WORK - NSDCPNINST_GEN_ALL_CORE
Thank you for choosing [facility]. It has been our pleasure taking care of you. Please let us know if you have any questions, concerns or needs. For a complete copy of medical records please visit : [email or web address]

## 2025-06-13 NOTE — PROGRESS NOTE ADULT - ASSESSMENT
69 y/o M with PMHx HTN, pAfib (stopped taking eliquis last year - noncompliant), presenting for LUE weakness and slurred speech at ~430pm, in ED code stroke called, CTs with no acute findings, given TNK at 17:54, admitted to ICU for close neuro monitoring.     Problems (POA):  #Acute CVA  #Paroxysmal atrial fibrillation  #HTN    Plan:  - Mentation intact, neuro symptoms resolved, MRI showing right perirolandic acute infarction with no hemorrhagic transformation or acute intracranial hemorrhage, discussed with neuro will restart patient's home eliquis, no DAPT, c/w high dose statin  - Hemodynamically stable, holding home losartan BPs wnl will continue to monitor, TTE LVEF 61% negative intracardiac shunt, statin, cards following, cont to monitor  - On room air  - S&S rec regular diet  - Stable renal indices, cont to monitor  - Afebrile, no leukocytosis, monitor off abx  - A1c 5.7  - DVT ppx restart home eliquis  - PT/OT

## 2025-06-13 NOTE — OCCUPATIONAL THERAPY INITIAL EVALUATION ADULT - MODALITIES TREATMENT COMMENTS
Pt transferred to EOB supervision, donned sock supervision with increased time, oral care while seated. Pt able to transfer to BS recliner chair use of RW for added support and safety CGA. Pt left in BS recliner chair + chair alarm, CBIR, tray table in front all ICU lines in tact (tele, pulse ox, BP cuff, LUE IV), NAD, VSS. Pt transferred to EOB supervision, donned sock supervision with increased time, oral care while seated. Pt able to transfer to BS recliner chair use of RW for added support and safety SBA (precautionary as first time up). Pt left in BS recliner chair + chair alarm, CBIR, tray table in front all ICU lines in tact (tele, pulse ox, BP cuff, LUE IV), NAD, VSS.

## 2025-06-13 NOTE — OCCUPATIONAL THERAPY INITIAL EVALUATION ADULT - NSACTIVITYREC_GEN_A_OT
Pt presents s/p suspected CVA. Pt presents with intermittent slurred speech, cautious during transitional movements and benefits from unilateral support to maintain balance. Pt will benefit from skilled OT services to improve functional activity tolerance, balance and ADL retraining to return to baseline. Pt will benefit from use of shower chair for bathing for added support and safety.

## 2025-06-13 NOTE — DISCHARGE NOTE NURSING/CASE MANAGEMENT/SOCIAL WORK - NSDCPEFALRISK_GEN_ALL_CORE
For information on Fall & Injury Prevention, visit: [email or web address] OR  [email or web address] OR  https://www.cdc.gov/steadi/patient.html

## 2025-06-13 NOTE — SWALLOW BEDSIDE ASSESSMENT ADULT - SWALLOW EVAL: DIAGNOSIS
1) The pt exhibits Oropharyngeal Swallowing integrity which subjectively appears to be within functional parameters for age. No behavioral aspiration signs exhibited. No change in O2 sats noted. Odynophagia denied.

## 2025-06-13 NOTE — SWALLOW BEDSIDE ASSESSMENT ADULT - SWALLOW EVAL: CRITERIA FOR SKILLED INTERVENTION MET
DO NOT FEEL THAT ACUTE SPEECH PATHOLOGY FOLLOW UP WOULD CHANGE CLINICAL MANAGEMENT/OUTCOME IN HOSPITAL SETTING. PT'S SPEECH-LANGUAGE AND OROPHARYNGEAL SWALLOWING INTEGRITY ARE FUNCTIONAL/AT USUAL STATE. NO OVERT NEED FOR ST. GIVEN ABOVE, THIS SERVICE WILL NOT ACTIVELY FOLLOW. RECONSULT PRN SHOULD STATUS CHANGE AND CONDITION WARRANT.

## 2025-06-13 NOTE — CONSULT NOTE ADULT - ASSESSMENT
71yo male with PMHx HTN, pAfib (stopped taking eliquis last year- noncompliant), presents for LUE weakness and slurred speech. 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 At that time his friend also noticed slurred speech. He believes it happened about 4:15-4:30pm. He states he has a history of Afib but has been in normal rhythm since last year, he chose to stop eliquis on his own. Denies chest pain, palpitation, HA, sob, abd pain, nausea. LE weakness 71yo male with PMHx HTN, pAfib (stopped taking eliquis last year- noncompliant), presents for LUE weakness and slurred speech. 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 At that time his friend also noticed slurred speech. He believes it happened about 4:15-4:30pm. He states he has a history of Afib but has been in normal rhythm since last year, he chose to stop eliquis on his own. Denies chest pain, palpitation, HA, sob, abd pain, nausea. LE weakness    Patient previously following with [facility] - Dr. Zapata   Echocardiogram done outpatient in 2023 - Mod to severe MR, NLVEF      Probable CVA s/p TPA  Hypertension  Hx of PAF - non compliant with Eliquis  Hyperlipidemia    Monitor on tele - SR on tele  Atorvastatin  MRI, TTE  F/u a1c, lipid profile  Will follow

## 2025-06-13 NOTE — OCCUPATIONAL THERAPY INITIAL EVALUATION ADULT - GENERAL OBSERVATIONS, REHAB EVAL
Pt rec'd semi-alex in bed, agreeable for OT IE. All ICU lines intact, L UE IV, pulse ox, BP cuff, tele, NAD, VSS. Supine /84 , HR 64, O2 100% on RA, RR 15, pulse 67.

## 2025-06-13 NOTE — DISCHARGE NOTE NURSING/CASE MANAGEMENT/SOCIAL WORK - FINANCIAL ASSISTANCE
[facility] provides services at a reduced cost to those who are determined to be eligible through [facility]’s financial assistance program. Information regarding [facility]’s financial assistance program can be found by going to [email or web address] or by calling [phone].

## 2025-06-13 NOTE — OCCUPATIONAL THERAPY INITIAL EVALUATION ADULT - RANGE OF MOTION EXAMINATION, UPPER EXTREMITY
Left UE Passive ROM was WFL  (within functional limits)/Right UE Passive ROM was WFL  (within functional limits) Left UE Active ROM was WFL (within functional limits)/Right UE Active ROM was WFL (within functional limits)

## 2025-06-13 NOTE — PHYSICAL THERAPY INITIAL EVALUATION ADULT - MODALITIES TREATMENT COMMENTS
pt left supine on stretcher, NAD, nsg in attendance finger to nose intact, no drift. pt left supine on stretcher, NAD, nsg in attendance

## 2025-06-13 NOTE — SWALLOW BEDSIDE ASSESSMENT ADULT - SWALLOW EVAL: PROGNOSIS
2) The pt is alert and interactive. He stated that yesterday his speech became acutely slurred but is now "much better". The pt followed multi step commands and was able to verbalize during communicative probes. At these times, pt's motor speech integrity was felt to be functional, his speech output is intelligible and his verbalizations were linguistically intact/contextually appropriate. The pt is communicatively competent.

## 2025-06-13 NOTE — DISCHARGE NOTE NURSING/CASE MANAGEMENT/SOCIAL WORK - NSDCFUADDAPPT_GEN_ALL_CORE_FT
CARDIOLOGY FOLLOW UP APPOINTMENT: 6/18/25 @4PM AT THE [facility] WITH KATTY MANCILLA

## 2025-06-13 NOTE — CONSULT NOTE ADULT - SUBJECTIVE AND OBJECTIVE BOX
CARDIOLOGY CONSULT NOTE:    CHIEF COMPLAINT: Patient is a 70y old  Male who presents with a chief complaint of slurred speech, LUE weakness (12 Jun 2025 18:32)    FROM H&P: 69yo male with PMHx HTN, pAfib (stopped taking eliquis last year- noncompliant), presents for LUE weakness and slurred speech. 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 At that time his friend also noticed slurred speech. He believes it happened about 4:15-4:30pm. He states he has a history of Afib but has been in normal rhythm since last year, he chose to stop eliquis on his own. Denies chest pain, palpitation, HA, sob, abd pain, nausea. LE weakness    Code Stroke called in ED  CTH negative  given TNK at 17:54    In ED, pt still with slurred speech, L sided facial droop and mild LUE weakness with  strength. /80, in sinus rhythm (12 Jun 2025 18:32)      6/13.    MEDICATIONS  (STANDING):  atorvastatin 80 milliGRAM(s) Oral at bedtime  sodium chloride 0.9%. 1000 milliLiter(s) (75 mL/Hr) IV Continuous <Continuous>    Home Medications:  losartan 25 mg oral tablet: 1 tab(s) orally once a day (12 Jun 2025 20:01)    PHYSICAL EXAM:  Vital Signs Last 24 Hrs  T(C): 36.9 (13 Jun 2025 06:00), Max: 36.9 (13 Jun 2025 06:00)  T(F): 98.5 (13 Jun 2025 06:00), Max: 98.5 (13 Jun 2025 06:00)  HR: 58 (13 Jun 2025 06:00) (58 - 110)  BP: 136/83 (13 Jun 2025 06:00) (80/68 - 167/85)  BP(mean): 96 (13 Jun 2025 06:00) (73 - 115)  RR: 12 (13 Jun 2025 06:00) (12 - 23)  SpO2: 97% (13 Jun 2025 06:00) (93% - 99%)    Parameters below as of 13 Jun 2025 06:00  Patient On (Oxygen Delivery Method): room air    Constitutional: NAD, awake and alert  HEENT: PERR, EOMI, Normal Hearing, MMM  Neck: Soft and supple, No LAD, No JVD  Respiratory: Breath sounds are clear bilaterally, No wheezing, rales or rhonchi  Cardiovascular: S1 and S2, regular rate and rhythm, no Murmurs, gallops or rubs  Gastrointestinal: Bowel Sounds present, soft, nontender, nondistended, no guarding, no rebound  Extremities: No peripheral edema  Vascular: 2+ peripheral pulses  Neurological: A/O x 3, no focal deficits  Musculoskeletal: 5/5 strength b/l upper and lower extremities  Skin: No rashes    =======================================    INTERPRETATION OF TELEMETRY:    ECG:    ========================================    LABS:                        13.2   6.51  )-----------( 169      ( 13 Jun 2025 05:30 )             39.4     06-13    139  |  109[H]  |  17  ----------------------------<  94  4.0   |  25  |  0.76    Ca    8.9      13 Jun 2025 05:30  Phos  2.6     06-13  Mg     2.1     06-13    TPro  7.3  /  Alb  3.9  /  TBili  0.3  /  DBili  x   /  AST  23  /  ALT  33  /  AlkPhos  44  06-12    PT/INR - ( 12 Jun 2025 17:22 )   PT: 11.2 sec;   INR: 0.95 ratio       PTT - ( 12 Jun 2025 17:22 )  PTT:26.2 sec    CARDIAC TESTING:          RADIOLOGY & ADDITIONAL STUDIES: CARDIOLOGY CONSULT NOTE:    CHIEF COMPLAINT: Patient is a 70y old  Male who presents with a chief complaint of slurred speech, LUE weakness (12 Jun 2025 18:32)    FROM H&P: 69yo male with PMHx HTN, pAfib (stopped taking eliquis last year- noncompliant), presents for LUE weakness and slurred speech. 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 At that time his friend also noticed slurred speech. He believes it happened about 4:15-4:30pm. He states he has a history of Afib but has been in normal rhythm since last year, he chose to stop eliquis on his own. Denies chest pain, palpitation, HA, sob, abd pain, nausea. LE weakness    Code Stroke called in ED  CTH negative  given TNK at 17:54    In ED, pt still with slurred speech, L sided facial droop and mild LUE weakness with  strength. /80, in sinus rhythm (12 Jun 2025 18:32)    6/13. Feeling better this AM  Awaiting further testing  Awaiting MRI and TTE  SR on tele    MEDICATIONS  (STANDING):  atorvastatin 80 milliGRAM(s) Oral at bedtime  sodium chloride 0.9%. 1000 milliLiter(s) (75 mL/Hr) IV Continuous <Continuous>    Home Medications:  losartan 25 mg oral tablet: 1 tab(s) orally once a day (12 Jun 2025 20:01)    PHYSICAL EXAM:  Vital Signs Last 24 Hrs  T(C): 36.9 (13 Jun 2025 06:00), Max: 36.9 (13 Jun 2025 06:00)  T(F): 98.5 (13 Jun 2025 06:00), Max: 98.5 (13 Jun 2025 06:00)  HR: 58 (13 Jun 2025 06:00) (58 - 110)  BP: 136/83 (13 Jun 2025 06:00) (80/68 - 167/85)  BP(mean): 96 (13 Jun 2025 06:00) (73 - 115)  RR: 12 (13 Jun 2025 06:00) (12 - 23)  SpO2: 97% (13 Jun 2025 06:00) (93% - 99%)    Parameters below as of 13 Jun 2025 06:00  Patient On (Oxygen Delivery Method): room air    Constitutional: NAD, awake and alert  HEENT: PERR, EOMI, Normal Hearing, MMM  Neck: Soft and supple, No LAD, No JVD  Respiratory: Breath sounds are clear bilaterally, No wheezing, rales or rhonchi  Cardiovascular: S1 and S2, regular rate and rhythm, no Murmurs, gallops or rubs  Gastrointestinal: Bowel Sounds present, soft, nontender, nondistended, no guarding, no rebound  Extremities: No peripheral edema  Vascular: 2+ peripheral pulses  Neurological: A/O x 3, no focal deficits  Musculoskeletal: 5/5 strength b/l upper and lower extremities  Skin: No rashes    =======================================    INTERPRETATION OF TELEMETRY: SR     ECG:     ========================================    LABS:                        13.2   6.51  )-----------( 169      ( 13 Jun 2025 05:30 )             39.4     06-13    139  |  109[H]  |  17  ----------------------------<  94  4.0   |  25  |  0.76    Ca    8.9      13 Jun 2025 05:30  Phos  2.6     06-13  Mg     2.1     06-13    TPro  7.3  /  Alb  3.9  /  TBili  0.3  /  DBili  x   /  AST  23  /  ALT  33  /  AlkPhos  44  06-12    PT/INR - ( 12 Jun 2025 17:22 )   PT: 11.2 sec;   INR: 0.95 ratio       PTT - ( 12 Jun 2025 17:22 )  PTT:26.2 sec    CARDIAC TESTING:          RADIOLOGY & ADDITIONAL STUDIES:    < from: CT Brain Stroke Protocol (06.12.25 @ 17:32) >  IMPRESSION:    CT head:  No acute abnormality.      Critical value:  I discussed the finding of this report with Dr. Galeas   at 5:43 PM on 6/12/2025.  Critical value policy of the hospital was   followed.  Read back and confirmation of receipt of this communication   was performed.  This verbal communication supplements the text report of   this document.    CTA brain: No hemodynamically significant stenosis    CTA carotid/vertebral artery circulation: No hemodynamically significant   stenosis    CT Perfusion: Scattered areas of delayed flow within the bilateral   cerebri and bilateral cerebelli, likely reflecting artifact given   different vascular distribution, and lack of thrombosis or stenosis on   the CTA.

## 2025-06-13 NOTE — PROGRESS NOTE ADULT - SUBJECTIVE AND OBJECTIVE BOX
Patient reports improvement of slurring of speech, able to talk without difficulty, facial droop has improved.  No complaints of headaches.    MRI brain reveals acute infarct right perirolandic region      ROS: As above, other ROS Negative      MEDICATIONS  (STANDING):  apixaban 5 milliGRAM(s) Oral two times a day  atorvastatin 80 milliGRAM(s) Oral at bedtime  chlorhexidine 4% Liquid 1 Application(s) Topical <User Schedule>      Vital Signs Last 24 Hrs  T(C): 36.6 (13 Jun 2025 16:00), Max: 36.9 (13 Jun 2025 06:00)  T(F): 97.9 (13 Jun 2025 16:00), Max: 98.5 (13 Jun 2025 06:00)  HR: 71 (13 Jun 2025 16:00) (58 - 110)  BP: 135/80 (13 Jun 2025 16:00) (80/68 - 167/85)  BP(mean): 92 (13 Jun 2025 16:00) (73 - 115)  RR: 18 (13 Jun 2025 16:00) (12 - 23)  SpO2: 98% (13 Jun 2025 16:00) (93% - 100%)    Parameters below as of 13 Jun 2025 16:00  Patient On (Oxygen Delivery Method): room air    Neurological exam:  HF: A x O x 3. Appropriately interactive, normal affect. Dysarthria resolved, No Aphasia or paraphasic errors. Naming /repetition intact   CN: DANIELLE, EOMI, VFF, facial sensation normal, mild left NLFD, tongue midline, Palate moves equally, SCM equal bilaterally  Motor: No pronator drift, Strength 5/5 in all 4 ext    Sens: Intact to light touch / PP    Reflexes: Symmetric and normal downgoing toes b/l  Coord:  No FNFA,   Gait/Balance: Cannot test    NIHSS: 1                            13.2   6.51  )-----------( 169      ( 13 Jun 2025 05:30 )             39.4     06-13    139  |  109[H]  |  17  ----------------------------<  94  4.0   |  25  |  0.76    Ca    8.9      13 Jun 2025 05:30  Phos  2.6     06-13  Mg     2.1     06-13    TPro  7.3  /  Alb  3.9  /  TBili  0.3  /  DBili  x   /  AST  23  /  ALT  33  /  AlkPhos  44  06-12 06-13 Chol 204[H]  HDL 56 Trig 63    Radiology report:  -< from: MR Head No Cont (06.13.25 @ 11:28) >  IMPRESSION:    Right perirolandic curvilinear restricted diffusion consistent with acute   infarction.    No hemorrhagic transformation or acute intracranial hemorrhage.    < from: CT Angio Neck Stroke Protocol w/ IV Cont (06.12.25 @ 17:40) >  IMPRESSION:    CT head:  No acute abnormality.      Critical value:  I discussed the finding of this report with Dr. Galeas   at 5:43 PM on 6/12/2025.  Critical value policy of the hospital was   followed.  Read back and confirmation of receipt of this communication   was performed.  This verbal communication supplements the text report of   this document.    CTA brain: No hemodynamically significant stenosis    CTA carotid/vertebral artery circulation: No hemodynamically significant   stenosis    CT Perfusion: Scattered areas of delayed flow within the bilateral   cerebri and bilateral cerebelli, likely reflecting artifact given   different vascular distribution, and lack of thrombosis or stenosis on the CTA.

## 2025-06-13 NOTE — PROGRESS NOTE ADULT - SUBJECTIVE AND OBJECTIVE BOX
OVERNIGHT EVENTS / SUBJECTIVE: Patient seen and examined at bedside.     No acute events overnight. Pt reports symptoms resolved, feels well at this time. BP wnl. Pending repeat CT vs MRI today.     OBJECTIVE:    VITAL SIGNS:  ICU Vital Signs Last 24 Hrs  T(C): 36.6 (13 Jun 2025 16:00), Max: 36.9 (13 Jun 2025 06:00)  T(F): 97.9 (13 Jun 2025 16:00), Max: 98.5 (13 Jun 2025 06:00)  HR: 55 (13 Jun 2025 19:00) (55 - 92)  BP: 122/66 (13 Jun 2025 19:00) (80/68 - 143/85)  BP(mean): 82 (13 Jun 2025 19:00) (73 - 104)  ABP: --  ABP(mean): --  RR: 13 (13 Jun 2025 19:00) (12 - 23)  SpO2: 95% (13 Jun 2025 19:00) (94% - 100%)    O2 Parameters below as of 13 Jun 2025 19:00  Patient On (Oxygen Delivery Method): room air              06-12 @ 07:01 - 06-13 @ 07:00  --------------------------------------------------------  IN: 942 mL / OUT: 2000 mL / NET: -1058 mL    06-13 @ 07:01 - 06-13 @ 20:05  --------------------------------------------------------  IN: 620 mL / OUT: 165 mL / NET: 455 mL      CAPILLARY BLOOD GLUCOSE  123 (12 Jun 2025 18:17)      POCT Blood Glucose.: 123 mg/dL (12 Jun 2025 17:17)      PHYSICAL EXAM:    General: NAD  HEENT: NC/AT; PERRL, clear conjunctiva  Neck: supple  Respiratory: CTA b/l  Cardiovascular: +S1/S2; RRR  Abdomen: soft, NT/ND; +BS x4  Extremities: WWP, 2+ peripheral pulses b/l; no LE edema  Skin: normal color and turgor; no rash  Neurological: A&Ox3, no focal deficit    MEDICATIONS:  MEDICATIONS  (STANDING):  apixaban 5 milliGRAM(s) Oral two times a day  atorvastatin 80 milliGRAM(s) Oral at bedtime  chlorhexidine 4% Liquid 1 Application(s) Topical <User Schedule>    MEDICATIONS  (PRN):      ALLERGIES:  Allergies    No Known Allergies    Intolerances        LABS:                        13.2   6.51  )-----------( 169      ( 13 Jun 2025 05:30 )             39.4     Hemoglobin: 13.2 g/dL (06-13 @ 05:30)  Hemoglobin: 13.6 g/dL (06-12 @ 17:22)    CBC Full  -  ( 13 Jun 2025 05:30 )  WBC Count : 6.51 K/uL  RBC Count : 4.58 M/uL  Hemoglobin : 13.2 g/dL  Hematocrit : 39.4 %  Platelet Count - Automated : 169 K/uL  Mean Cell Volume : 86.0 fl  Mean Cell Hemoglobin : 28.8 pg  Mean Cell Hemoglobin Concentration : 33.5 g/dL  Auto Neutrophil # : x  Auto Lymphocyte # : x  Auto Monocyte # : x  Auto Eosinophil # : x  Auto Basophil # : x  Auto Neutrophil % : x  Auto Lymphocyte % : x  Auto Monocyte % : x  Auto Eosinophil % : x  Auto Basophil % : x    06-13    139  |  109[H]  |  17  ----------------------------<  94  4.0   |  25  |  0.76    Ca    8.9      13 Jun 2025 05:30  Phos  2.6     06-13  Mg     2.1     06-13    TPro  7.3  /  Alb  3.9  /  TBili  0.3  /  DBili  x   /  AST  23  /  ALT  33  /  AlkPhos  44  06-12    Creatinine Trend: 0.76<--, 1.07<--  LIVER FUNCTIONS - ( 12 Jun 2025 17:22 )  Alb: 3.9 g/dL / Pro: 7.3 gm/dL / ALK PHOS: 44 U/L / ALT: 33 U/L / AST: 23 U/L / GGT: x           PT/INR - ( 12 Jun 2025 17:22 )   PT: 11.2 sec;   INR: 0.95 ratio         PTT - ( 12 Jun 2025 17:22 )  PTT:26.2 sec    hs Troponin:            Urinalysis Basic - ( 13 Jun 2025 05:30 )    Color: x / Appearance: x / SG: x / pH: x  Gluc: 94 mg/dL / Ketone: x  / Bili: x / Urobili: x   Blood: x / Protein: x / Nitrite: x   Leuk Esterase: x / RBC: x / WBC x   Sq Epi: x / Non Sq Epi: x / Bacteria: x      CSF:        EKG:   MICROBIOLOGY:    IMAGING:      Labs, imaging, EKG personally reviewed    RADIOLOGY & ADDITIONAL TESTS: Reviewed.

## 2025-06-13 NOTE — PROGRESS NOTE ADULT - ASSESSMENT
62-year-old male with PMHx of HTN, PAF–not on AC, presented to [facility] ED at 1715 hrs. with complaints of slurred speech, left facial droop, preceded by left hand clumsiness and incoordination that started at 1630 hrs. after playing golf.  Code stroke evaluation NIHSS 3,In ED NIHSS 3 for dense left facial droop and dysarthria, CT scan head - no acute findings, patient was within the 3-hour window.  For thrombolytic therapy, given brief episode of left hand incoordination that resolved and current NIH score 3, after discussing with the patient and his wife it was decided to proceed with TNK, weight-based TNK was administered at 1754 hrs. bp 145/92    Recommendations:  -Admit to ICU, monitor BP/neuro checks per protocol  -No ASA/PLAVIX x 24 hours  -Atorvastatin 80 mg  -MRI brain  -TTE with bubble   -AIC, lipid profile  -DVT prophylaxis  -Speech and swallow eval  -PT eval/ rehab eval     62-year-old male with PMHx of HTN, PAF–not on AC, presented to [facility] ED at 1715 hrs. with complaints of slurred speech, left facial droop, preceded by left hand clumsiness and incoordination that started at 1630 hrs. after playing golf.  Code stroke evaluation NIHSS 3,In ED NIHSS 3 for dense left facial droop and dysarthria, CT scan head - no acute findings, patient was within the 3-hour window.  For thrombolytic therapy, given brief episode of left hand incoordination that resolved and current NIH score 3, after discussing with the patient and his wife it was decided to proceed with TNK, weight-based TNK was administered at 1754 hrs. bp 145/92.  On exam today NIHSS 0.      #likely R sided stroke  -NIHSS 3-->0        Recommendations:  -Monitor on tele, monitor BP/neuro checks per protocol  -No ASA/PLAVIX x 24 hours till repeat imaging is neg for bleed  -Continue Atorvastatin 80 mg  -F/U MRI brain  -F/U TTE with bubble   -AIC, lipid profile  -DVT prophylaxis  -Speech and swallow eval  -PT eval/ rehab eval    D/W Dr. Ramirez, Dr. Fields, patient

## 2025-06-13 NOTE — OCCUPATIONAL THERAPY INITIAL EVALUATION ADULT - ADDITIONAL COMMENTS
Pt lives with spouse in pvt 2 story house, 3 PHILIP and 1st fl s/u. WIS, std toilet. Pt community ambulator without AD. PTA pt indep ADL/IADL, +, +working FT.

## 2025-06-13 NOTE — SWALLOW BEDSIDE ASSESSMENT ADULT - COMMENTS
The pt was admitted to  with slurred speech, left facial droop and possible LUE weakness that are improving. CTH negative. Stroke work up in progress. This profile is superimposed upon a history of atrial fibrillation and hypertension. See below for prior surgical history.

## 2025-06-13 NOTE — OCCUPATIONAL THERAPY INITIAL EVALUATION ADULT - BALANCE TRAINING, PT EVAL
Pt will improve dynamic standing balance by 1/2 by d/c to improve standing ADLs. Pt will improve standing tolerance to 3-5 min by d/c to improve participation/safety in functional task performance.

## 2025-06-13 NOTE — PHYSICAL THERAPY INITIAL EVALUATION ADULT - GENERAL OBSERVATIONS, REHAB EVAL
pt rec'd sitting in BS chair in ICU, monitors. transporter arrived for MRI. pleasant/cooperative. slightly slurred words few times- pt reports much improved from yesterday and otherwise feeling back to "100%" if not for having not eaten and poor sleep

## 2025-06-13 NOTE — DISCHARGE NOTE NURSING/CASE MANAGEMENT/SOCIAL WORK - PATIENT PORTAL LINK FT
You can access the FollowMyHealth Patient Portal offered by [facility] by registering at the following website: [email or web address]. By joining [facility]’s FollowMyHealth portal, you will also be able to view your health information using other applications (apps) compatible with our system.

## 2025-06-14 DIAGNOSIS — I63.9 CEREBRAL INFARCTION, UNSPECIFIED: ICD-10-CM

## 2025-06-14 DIAGNOSIS — I48.0 PAROXYSMAL ATRIAL FIBRILLATION: ICD-10-CM

## 2025-06-14 DIAGNOSIS — I10 ESSENTIAL (PRIMARY) HYPERTENSION: ICD-10-CM

## 2025-06-14 LAB
ANION GAP SERPL CALC-SCNC: 5 MMOL/L — SIGNIFICANT CHANGE UP (ref 5–17)
BUN SERPL-MCNC: 14 MG/DL — SIGNIFICANT CHANGE UP (ref 7–23)
CALCIUM SERPL-MCNC: 8.9 MG/DL — SIGNIFICANT CHANGE UP (ref 8.5–10.1)
CHLORIDE SERPL-SCNC: 108 MMOL/L — SIGNIFICANT CHANGE UP (ref 96–108)
CO2 SERPL-SCNC: 24 MMOL/L — SIGNIFICANT CHANGE UP (ref 22–31)
CREAT SERPL-MCNC: 0.76 MG/DL — SIGNIFICANT CHANGE UP (ref 0.5–1.3)
EGFR: 98 ML/MIN/1.73M2 — SIGNIFICANT CHANGE UP
EGFR: 98 ML/MIN/1.73M2 — SIGNIFICANT CHANGE UP
GLUCOSE SERPL-MCNC: 101 MG/DL — HIGH (ref 70–99)
HCT VFR BLD CALC: 41.7 % — SIGNIFICANT CHANGE UP (ref 39–50)
HGB BLD-MCNC: 13.6 G/DL — SIGNIFICANT CHANGE UP (ref 13–17)
MAGNESIUM SERPL-MCNC: 2.2 MG/DL — SIGNIFICANT CHANGE UP (ref 1.6–2.6)
MCHC RBC-ENTMCNC: 28.4 PG — SIGNIFICANT CHANGE UP (ref 27–34)
MCHC RBC-ENTMCNC: 32.6 G/DL — SIGNIFICANT CHANGE UP (ref 32–36)
MCV RBC AUTO: 87.1 FL — SIGNIFICANT CHANGE UP (ref 80–100)
NRBC # BLD AUTO: 0 K/UL — SIGNIFICANT CHANGE UP (ref 0–0)
NRBC # FLD: 0 K/UL — SIGNIFICANT CHANGE UP (ref 0–0)
NRBC BLD AUTO-RTO: 0 /100 WBCS — SIGNIFICANT CHANGE UP (ref 0–0)
PHOSPHATE SERPL-MCNC: 3.3 MG/DL — SIGNIFICANT CHANGE UP (ref 2.5–4.5)
PLATELET # BLD AUTO: 170 K/UL — SIGNIFICANT CHANGE UP (ref 150–400)
PMV BLD: 10.4 FL — SIGNIFICANT CHANGE UP (ref 7–13)
POTASSIUM SERPL-MCNC: 4.2 MMOL/L — SIGNIFICANT CHANGE UP (ref 3.5–5.3)
POTASSIUM SERPL-SCNC: 4.2 MMOL/L — SIGNIFICANT CHANGE UP (ref 3.5–5.3)
RBC # BLD: 4.79 M/UL — SIGNIFICANT CHANGE UP (ref 4.2–5.8)
RBC # FLD: 12.8 % — SIGNIFICANT CHANGE UP (ref 10.3–14.5)
SODIUM SERPL-SCNC: 137 MMOL/L — SIGNIFICANT CHANGE UP (ref 135–145)
WBC # BLD: 5.93 K/UL — SIGNIFICANT CHANGE UP (ref 3.8–10.5)
WBC # FLD AUTO: 5.93 K/UL — SIGNIFICANT CHANGE UP (ref 3.8–10.5)

## 2025-06-14 PROCEDURE — 99232 SBSQ HOSP IP/OBS MODERATE 35: CPT

## 2025-06-14 PROCEDURE — 99222 1ST HOSP IP/OBS MODERATE 55: CPT

## 2025-06-14 RX ADMIN — APIXABAN 5 MILLIGRAM(S): 2.5 TABLET, FILM COATED ORAL at 21:07

## 2025-06-14 RX ADMIN — APIXABAN 5 MILLIGRAM(S): 2.5 TABLET, FILM COATED ORAL at 09:31

## 2025-06-14 RX ADMIN — ATORVASTATIN CALCIUM 80 MILLIGRAM(S): 80 TABLET, FILM COATED ORAL at 21:07

## 2025-06-14 NOTE — PROGRESS NOTE ADULT - SUBJECTIVE AND OBJECTIVE BOX
Patient is a 71YO male with PMHx HTN, pAfib (stopped taking eliquis last year- noncompliant), presents for LUE weakness and slurred speech. 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 At that time his friend also noticed slurred speech. He believes it happened about 4:15-4:30pm. He states he has a history of Afib but has been in normal rhythm since last year, he chose to stop eliquis on his own. Denies chest pain, palpitation, HA, sob, abd pain, nausea. LE weakness. code stroked called in the ED, received TNK on 6/12.    Brief  hospital course:    Admitted to ICU for stroke s/p TPA, Anabella by cardiology, will follow after TTE.  Seen by neuro, recommended high intensity statins and DOAC discussion for afib (will determine if pt will continue on DAPT)  Pt stable to be transfer to medical floor.      LABS:  cret                        13.2   6.51  )-----------( 169      ( 13 Jun 2025 05:30 )             39.4     06-13    139  |  109[H]  |  17  ----------------------------<  94  4.0   |  25  |  0.76    Ca    8.9      13 Jun 2025 05:30  Phos  2.6     06-13  Mg     2.1     06-13    TPro  7.3  /  Alb  3.9  /  TBili  0.3  /  DBili  x   /  AST  23  /  ALT  33  /  AlkPhos  44  06-12    PT/INR - ( 12 Jun 2025 17:22 )   PT: 11.2 sec;   INR: 0.95 ratio         PTT - ( 12 Jun 2025 17:22 )  PTT:26.2 sec    Images:    < from: TTE W or WO Ultrasound Enhancing Agent (06.13.25 @ 12:16) >  CONCLUSIONS:      1. Left ventricularcavity is normal in size. Left ventricular wall thickness is normal. Left ventricular systolic function is normal with an ejection fraction of 61 % by Talavera's method of disks with an ejection fraction visually estimated at 60 to 65 %.   2. Normal left ventricular diastolic function.   3. Normal right ventricular cavity size and normal right ventricular systolic function.   4. Normal left and right atrial size.   5. Trace mitral regurgitation.   6. Mild tricuspid regurgitation.   7. Estimated pulmonary artery systolic pressure is 30 mmHg, consistent with normal pulmonary artery pressure.   8. Mild pulmonic regurgitation.   9. Trileaflet aortic valve with normal systolic excursion.  10. Interatrial septum is aneurysmal.  11. Agitated saline injection was negative for intracardiac shunt.    < end of copied text >  < from: MR Head No Cont (06.13.25 @ 11:28) >    IMPRESSION:    Right perirolandic curvilinear restricted diffusion consistent with acute   infarction.    No hemorrhagic transformation or acute intracranial hemorrhage.    Dr. Whitehead discussed these findingswith Dr. Escobar Cowart on 6/13/2025   2:12 PM with read back.    --- End of Report ---    < end of copied text >  < from: Xray Chest 1 View AP/PA. (06.12.25 @ 17:57) >  IMPRESSION: No acute cardiopulmonary disease process.    < end of copied text >  < from: CT Brain Perfusion Maps Stroke (06.12.25 @ 17:40) >  IMPRESSION:    CT head:  No acute abnormality.      Critical value:  I discussed the finding of this report with Dr. Galeas   at 5:43 PM on 6/12/2025.  Critical value policy of the hospital was   followed.  Read back and confirmation of receipt of this communication   was performed.  This verbal communication supplements the text report of   this document.    CTA brain: No hemodynamically significant stenosis    CTA carotid/vertebral artery circulation: No hemodynamically significant   stenosis    CT Perfusion: Scattered areas of delayed flow within the bilateral   cerebri and bilateral cerebelli, likely reflecting artifact given   different vascular distribution, and lack of thrombosis or stenosis on   the CTA.    --- End of Report ---    < end of copied text >    ICU Vital Signs Last 24 Hrs  T(C): 36.7 (14 Jun 2025 04:45), Max: 36.7 (13 Jun 2025 08:00)  T(F): 98 (14 Jun 2025 04:45), Max: 98.1 (13 Jun 2025 08:00)  HR: 57 (14 Jun 2025 04:45) (52 - 75)  BP: 122/77 (14 Jun 2025 04:45) (108/76 - 143/82)  BP(mean): 85 (14 Jun 2025 00:00) (76 - 104)  ABP: --  ABP(mean): --  RR: 18 (14 Jun 2025 04:45) (12 - 18)  SpO2: 96% (14 Jun 2025 04:45) (94% - 100%)    O2 Parameters below as of 14 Jun 2025 04:45  Patient On (Oxygen Delivery Method): room air      PHYSICAL EXAM:     Patient is a 69YO male with PMHx HTN, pAfib (stopped taking eliquis last year- noncompliant), presents for LUE weakness and slurred speech. 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 At that time his friend also noticed slurred speech. He believes it happened about 4:15-4:30pm. He states he has a history of Afib but has been in normal rhythm since last year, he chose to stop eliquis on his own. Denies chest pain, palpitation, HA, sob, abd pain, nausea. LE weakness. code stroked called in the ED, received TNK on 6/12.    Brief  hospital course:    Admitted to ICU for stroke s/p TPA, Anabella by cardiology, will follow after TTE.  Seen by neuro, recommended high intensity statins and DOAC discussion for afib (will determine if pt will continue on DAPT)  Pt stable to be transfer to medical floor.      LABS:  cret                        13.2   6.51  )-----------( 169      ( 13 Jun 2025 05:30 )             39.4     06-13    139  |  109[H]  |  17  ----------------------------<  94  4.0   |  25  |  0.76    Ca    8.9      13 Jun 2025 05:30  Phos  2.6     06-13  Mg     2.1     06-13    TPro  7.3  /  Alb  3.9  /  TBili  0.3  /  DBili  x   /  AST  23  /  ALT  33  /  AlkPhos  44  06-12    PT/INR - ( 12 Jun 2025 17:22 )   PT: 11.2 sec;   INR: 0.95 ratio         PTT - ( 12 Jun 2025 17:22 )  PTT:26.2 sec    Images:    < from: TTE W or WO Ultrasound Enhancing Agent (06.13.25 @ 12:16) >  CONCLUSIONS:      1. Left ventricularcavity is normal in size. Left ventricular wall thickness is normal. Left ventricular systolic function is normal with an ejection fraction of 61 % by Talavera's method of disks with an ejection fraction visually estimated at 60 to 65 %.   2. Normal left ventricular diastolic function.   3. Normal right ventricular cavity size and normal right ventricular systolic function.   4. Normal left and right atrial size.   5. Trace mitral regurgitation.   6. Mild tricuspid regurgitation.   7. Estimated pulmonary artery systolic pressure is 30 mmHg, consistent with normal pulmonary artery pressure.   8. Mild pulmonic regurgitation.   9. Trileaflet aortic valve with normal systolic excursion.  10. Interatrial septum is aneurysmal.  11. Agitated saline injection was negative for intracardiac shunt.    < end of copied text >  < from: MR Head No Cont (06.13.25 @ 11:28) >    IMPRESSION:    Right perirolandic curvilinear restricted diffusion consistent with acute   infarction.    No hemorrhagic transformation or acute intracranial hemorrhage.    Dr. Whitehead discussed these findingswith Dr. Escobar Cowart on 6/13/2025   2:12 PM with read back.    --- End of Report ---    < end of copied text >  < from: Xray Chest 1 View AP/PA. (06.12.25 @ 17:57) >  IMPRESSION: No acute cardiopulmonary disease process.    < end of copied text >  < from: CT Brain Perfusion Maps Stroke (06.12.25 @ 17:40) >  IMPRESSION:    CT head:  No acute abnormality.      Critical value:  I discussed the finding of this report with Dr. Galeas   at 5:43 PM on 6/12/2025.  Critical value policy of the hospital was   followed.  Read back and confirmation of receipt of this communication   was performed.  This verbal communication supplements the text report of   this document.    CTA brain: No hemodynamically significant stenosis    CTA carotid/vertebral artery circulation: No hemodynamically significant   stenosis    CT Perfusion: Scattered areas of delayed flow within the bilateral   cerebri and bilateral cerebelli, likely reflecting artifact given   different vascular distribution, and lack of thrombosis or stenosis on   the CTA.    --- End of Report ---    < end of copied text >    ICU Vital Signs Last 24 Hrs  T(C): 36.7 (14 Jun 2025 04:45), Max: 36.7 (13 Jun 2025 08:00)  T(F): 98 (14 Jun 2025 04:45), Max: 98.1 (13 Jun 2025 08:00)  HR: 57 (14 Jun 2025 04:45) (52 - 75)  BP: 122/77 (14 Jun 2025 04:45) (108/76 - 143/82)  BP(mean): 85 (14 Jun 2025 00:00) (76 - 104)  ABP: --  ABP(mean): --  RR: 18 (14 Jun 2025 04:45) (12 - 18)  SpO2: 96% (14 Jun 2025 04:45) (94% - 100%)    O2 Parameters below as of 14 Jun 2025 04:45  Patient On (Oxygen Delivery Method): room air      PHYSICAL EXAM:  GENERAL: NAD, speaks in full sentences, no signs of respiratory distress  HEAD:  Atraumatic, Normocephalic  NECK: Supple, No JVD  CHEST/LUNG: Clear to auscultation bilaterally; No wheeze; No crackles; No accessory muscles used  HEART: Regular rate and rhythm; No murmurs;   ABDOMEN: Soft, Nontender, Nondistended; Bowel sounds present; No guarding  EXTREMITIES:  2+ Peripheral Pulses, No cyanosis or edema  PSYCH: AAOx3  NEUROLOGY: non-focal, no residual deficits

## 2025-06-14 NOTE — PROGRESS NOTE ADULT - ASSESSMENT
In summary, this is a 69 YO M admitted for acute right Perirolandic infarct, status post TNK.    #Acute Stroke - right Perirolandic infarct s/p TNK, Echo with bubble normal  Continue Eliquis and high intensity statins  Neuro follow up  Follow PT recommendations    #HTN- Controlled  Continue to monitor  Pt on Losartan 25mg at home, consider to resume if evidence of elevated BP    #HLD -   Continue Atorvastatin 80mg OD    #Paroxysmal Afib - rate controlled, bradycardic  Follow Cardiology recommendations  Continue Eliquis 5mg BID

## 2025-06-14 NOTE — PROGRESS NOTE ADULT - SUBJECTIVE AND OBJECTIVE BOX
Patient is a 70y old  Male who presents with a chief complaint of slurred speech, LUE weakness (14 Jun 2025 07:17)  6/14- feels well no focal deficits      MEDICATIONS  (STANDING):  apixaban 5 milliGRAM(s) Oral two times a day  atorvastatin 80 milliGRAM(s) Oral at bedtime  chlorhexidine 4% Liquid 1 Application(s) Topical <User Schedule>    MEDICATIONS  (PRN):            Vital Signs Last 24 Hrs  T(C): 36.3 (14 Jun 2025 08:10), Max: 36.7 (13 Jun 2025 12:00)  T(F): 97.3 (14 Jun 2025 08:10), Max: 98 (13 Jun 2025 12:00)  HR: 58 (14 Jun 2025 08:10) (52 - 75)  BP: 121/79 (14 Jun 2025 08:10) (108/76 - 143/82)  BP(mean): 92 (14 Jun 2025 08:10) (76 - 104)  RR: 18 (14 Jun 2025 08:10) (12 - 18)  SpO2: 95% (14 Jun 2025 08:10) (94% - 100%)    Parameters below as of 14 Jun 2025 08:10  Patient On (Oxygen Delivery Method): room air                INTERPRETATION OF TELEMETRY:  SR   ECG:        LABS:                        13.6   5.93  )-----------( 170      ( 14 Jun 2025 07:01 )             41.7     06-14    137  |  108  |  14  ----------------------------<  101[H]  4.2   |  24  |  0.76    Ca    8.9      14 Jun 2025 07:01  Phos  3.3     06-14  Mg     2.2     06-14    TPro  7.3  /  Alb  3.9  /  TBili  0.3  /  DBili  x   /  AST  23  /  ALT  33  /  AlkPhos  44  06-12        PT/INR - ( 12 Jun 2025 17:22 )   PT: 11.2 sec;   INR: 0.95 ratio         PTT - ( 12 Jun 2025 17:22 )  PTT:26.2 sec  Urinalysis Basic - ( 14 Jun 2025 07:01 )    Color: x / Appearance: x / SG: x / pH: x  Gluc: 101 mg/dL / Ketone: x  / Bili: x / Urobili: x   Blood: x / Protein: x / Nitrite: x   Leuk Esterase: x / RBC: x / WBC x   Sq Epi: x / Non Sq Epi: x / Bacteria: x      I&O's Summary    13 Jun 2025 07:01  -  14 Jun 2025 07:00  --------------------------------------------------------  IN: 620 mL / OUT: 915 mL / NET: -295 mL      BNP  RADIOLOGY & ADDITIONAL STUDIES:

## 2025-06-14 NOTE — PROGRESS NOTE ADULT - ASSESSMENT
69yo male with PMHx HTN, pAfib (stopped taking eliquis last year- noncompliant), presents for LUE weakness and slurred speech. 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 At that time his friend also noticed slurred speech. He believes it happened about 4:15-4:30pm. He states he has a history of Afib but has been in normal rhythm since last year, he chose to stop eliquis on his own. Denies chest pain, palpitation, HA, sob, abd pain, nausea. LE weakness    Patient previously following with [facility] - Dr. Zapata   Echocardiogram done outpatient in 2023 - Mod to severe MR, NLVEF   echo this admission shows trace MR normal LV function IASA but no PFO by bubble study       Probable CVA s/p TPA  Hypertension  Hx of PAF - non compliant with Eliquis now back on eliquis and understands importance , He assures me he will be compliant in the future   Hyperlipidemia  cont statins   Monitor on tele - SR on tele  Atorvastatin

## 2025-06-14 NOTE — PROGRESS NOTE ADULT - SUBJECTIVE AND OBJECTIVE BOX
HPI:  69yo man PMHx HTN, pAfib (stopped taking eliquis last year- noncompliant), presents for LUE weakness and slurred speech. Code Stroke called in ED, CTH negative, given TNK.Today patient reports feels fine denies any change in speech, trouble swallowing, change in vision, no weakness, no difficulty eating or getting out of bed.  Able to walk down the corridor without difficulties.      MEDICATIONS  (STANDING):  apixaban 5 milliGRAM(s) Oral two times a day  atorvastatin 80 milliGRAM(s) Oral at bedtime  chlorhexidine 4% Liquid 1 Application(s) Topical <User Schedule>    MEDICATIONS  (PRN):        Vital Signs Last 24 Hrs  T(C): 36.6 (14 Jun 2025 12:30), Max: 36.7 (14 Jun 2025 04:45)  T(F): 97.9 (14 Jun 2025 12:30), Max: 98 (14 Jun 2025 04:45)  HR: 68 (14 Jun 2025 12:30) (52 - 75)  BP: 141/86 (14 Jun 2025 12:30) (108/76 - 141/86)  BP(mean): 92 (14 Jun 2025 08:10) (76 - 104)  RR: 18 (14 Jun 2025 12:30) (12 - 18)  SpO2: 96% (14 Jun 2025 12:30) (94% - 99%)    Parameters below as of 14 Jun 2025 08:10  Patient On (Oxygen Delivery Method): room air      Neurological Exam:    HF: Patient is alert and oriented x 3. There is no aphasia or dysarthria. Follows complex commands.     CN: Vision is intact to confrontation. Pupils are equal and reactive. Extra ocular muscles are intact. There is no facial droop or asymmetry. Tongue is midline. Sensation is intact in the face. Other CN II-XII are intact.     Motor: motor examination all muscles are 5/5 and there is no pronator drift.     Sensory: intact to pinprick and touch. VS intact    DTR: 2/4 all 4 extremities. Babinski is negative bilateral.    Co-ord:  Finger to finger to nose is intact. Heel to shin is intact bilaterally.     Gait/balance: Patient ambulates without difficulty.       < from: MR Head No Cont (06.13.25 @ 11:28) >  IMPRESSION:    Right perirolandic curvilinear restricted diffusion consistent with acute   infarction.    No hemorrhagic transformation or acute intracranial hemorrhage.    Dr. Whitehead discussed these findingswith Dr. Escobar Cowart on 6/13/2025   2:12 PM with read back.    --- End of Report ---            DOLORES WHITEHEAD MD; Attending Radiologist  This document has been electronically signed. Jun 13 2025  2:12PM    < end of copied text >    < from: CT Brain Perfusion Maps Stroke (06.12.25 @ 17:40) >    IMPRESSION:    CT head:  No acute abnormality.      Critical value:  I discussed the finding of this report with Dr. Galeas   at 5:43 PM on 6/12/2025.  Critical value policy of the hospital was   followed.  Read back and confirmation of receipt of this communication   was performed.  This verbal communication supplements the text report of   this document.    CTA brain: No hemodynamically significant stenosis    CTA carotid/vertebral artery circulation: No hemodynamically significant   stenosis    CT Perfusion: Scattered areas of delayed flow within the bilateral   cerebri and bilateral cerebelli, likely reflecting artifact given   different vascular distribution, and lack of thrombosis or stenosis on   the CTA.    --- End of Report ---        < from: TTE W or WO Ultrasound Enhancing Agent (06.13.25 @ 12:16) >     CONCLUSIONS:      1. Left ventricularcavity is normal in size. Left ventricular wall thickness is normal. Left ventricular systolic function is normal with an ejection fraction of 61 % by Talavera's method of disks with an ejection fraction visually estimated at 60 to 65 %.   2. Normal left ventricular diastolic function.   3. Normal right ventricular cavity size and normal right ventricular systolic function.   4. Normal left and right atrial size.   5. Trace mitral regurgitation.   6. Mild tricuspid regurgitation.   7. Estimated pulmonary artery systolic pressure is 30 mmHg, consistent with normal pulmonary artery pressure.   8. Mild pulmonic regurgitation.   9. Trileaflet aortic valve with normal systolic excursion.  10. Interatrial septum is aneurysmal.  11. Agitated saline injection was negative for intracardiac shunt.    < end of copied text >          REJI HAMMOND MD; Attending Radiologist  This document has been electronically signed. Jun 12 2025  5:56PM    < end of copied text >

## 2025-06-14 NOTE — PROGRESS NOTE ADULT - ASSESSMENT
70-year-old man with a history of atrial fibrillation, of anticoagulation, presented with transient slurred speech, left-sided weakness now improved significantly.  Status post TN K.  Imaging consistent with a right cortical stroke, likely embolic.  No large vessel occlusion.  Now placed back on apixaban.  Recommendations:  Follow-up telemetry.  Continue statin,  No aspirin  Follow-up blood pressure, aim for systolic under 140.  Continue long-term anticoagulation.  Discussed with the patient recent findings, advised he should not come off  anticoagulation.  No further neurological recommendations.  Will sign of case.  Once discharged can follow-up with my office.

## 2025-06-15 PROCEDURE — 99233 SBSQ HOSP IP/OBS HIGH 50: CPT

## 2025-06-15 RX ADMIN — APIXABAN 5 MILLIGRAM(S): 2.5 TABLET, FILM COATED ORAL at 09:54

## 2025-06-15 RX ADMIN — ATORVASTATIN CALCIUM 80 MILLIGRAM(S): 80 TABLET, FILM COATED ORAL at 21:27

## 2025-06-15 RX ADMIN — APIXABAN 5 MILLIGRAM(S): 2.5 TABLET, FILM COATED ORAL at 21:27

## 2025-06-15 NOTE — DISCHARGE NOTE PROVIDER - NSDCCAREPROVSEEN_GEN_ALL_CORE_FT
Supa, Escobar Sharp, Jessica Chao, Stephanie Culp, Familia Burrows, Valerie Luciano, Mason Calhoun, Ish Ramirez, Kandice Fields, Roberto Leach, Rolan MADRID

## 2025-06-15 NOTE — DISCHARGE NOTE PROVIDER - NSDCCPTREATMENT_GEN_ALL_CORE_FT
PRINCIPAL PROCEDURE  Procedure: Brain MRI  Findings and Treatment: FINDINGS:  Right perirolandic curvilinear restricted diffusion consistent with acute   infarction.  No hemorrhagic transformation or acute intracranial hemorrhage.  No vasogenic edema or mass effect.  No hydrocephalus, midline shift, or effacement of the basal cisterns.  Signal voids are seen within the major intracranial vessels consistent   with their patency.  Scattered ethmoid sinus mucosal thickening. Remaining visualized   paranasal sinuses and mastoid air cells are clear.  The orbits, sellar and suprasellar structures, and craniocervical   junction are unremarkable.  IMPRESSION:  Right perirolandic curvilinear restricted diffusion consistent with acute   infarction.  No hemorrhagic transformation or acute intracranial hemorrhage.  Dr. Whitehead discussed these findingswith Dr. Escobar Cowart on 6/13/2025   2:12 PM with read back.        SECONDARY PROCEDURE  Procedure: Complete transthoracic echocardiography (TTE)  Findings and Treatment: CONCLUSIONS:      1. Left ventricularcavity is normal in size. Left ventricular wall thickness is normal. Left ventricular systolic function is normal with an ejection fraction of 61 % by Talavera's method of disks with an ejection fraction visually estimated at 60 to 65 %.   2. Normal left ventricular diastolic function.   3. Normal right ventricular cavity size and normal right ventricular systolic function.   4. Normal left and right atrial size.   5. Trace mitral regurgitation.   6. Mild tricuspid regurgitation.   7. Estimated pulmonary artery systolic pressure is 30 mmHg, consistent with normal pulmonary artery pressure.   8. Mild pulmonic regurgitation.   9. Trileaflet aortic valve with normal systolic excursion.  10. Interatrial septum is aneurysmal.  11. Agitated saline injection was negative for intracardiac shunt.

## 2025-06-15 NOTE — DISCHARGE NOTE PROVIDER - HOSPITAL COURSE
Admission HPI: HPI: 71yo male with PMHx HTN, pAfib (stopped taking eliquis last year- noncompliant), presents for LUE weakness and slurred speech. 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 At that time his friend also noticed slurred speech. He believes it happened about 4:15-4:30pm. He states he has a history of Afib but has been in normal rhythm since last year, he chose to stop eliquis on his own. Denies chest pain, palpitation, HA, sob, abd pain, nausea. LE weakness  Code Stroke called in ED  CTH negative  given TNK at 17:54    Brief  hospital course: Admitted to ICU for stroke s/p TPA. Symptoms improved. Eliquis now resumed. Echo with normal bubble study. Neurology and Cardiology consult appreciated. Of note, episodes of pauses on telemetry overnight while sleeping. EP eval appreciated.     Hospital course (by problem):   #Right cortical stroke (likely embolic) s/p TNK  Echo with normal bubble study  Continue Eliquis and high intensity statin  Neuro consult appreciated -- follow up outpatient   PT recommendations noted, no skilled needs     #HTN- Controlled  Continue to monitor. Resume Losartan outpatient if BP elevated     #HLD -   Continue Atorvastatin 80mg OD    #Paroxysmal Afib - rate controlled, bradycardic  #Second degree AVB type 1 and pauses likely secondary to untreated SHANT  -Pauses and HB occurring on telemetry overnight, asymptomatic while sleeping  -Per patient, has been told he has pauses at night in the past. Has done outpatient sleep study  -EP consulted   -Cardiology eval appreciated   Continue Eliquis 5mg BID    Patient was discharged to: Home (home care not needed)     Physical exam at the time of discharge:  LOS: 3d    VITALS:   T(C): 36.7 (06-15-25 @ 11:48), Max: 36.8 (06-14-25 @ 16:05)  HR: 63 (06-15-25 @ 11:48) (57 - 67)  BP: 131/85 (06-15-25 @ 11:48) (104/67 - 141/91)  RR: 18 (06-15-25 @ 11:48) (18 - 19)  SpO2: 98% (06-15-25 @ 11:48) (94% - 99%)    PHYSICAL EXAM:  GENERAL: NAD, well-groomed, well-developed  HEAD:  Atraumatic, Normocephalic  EYES: EOMI, PERRLA, conjunctiva and sclera clear  ENMT: No tonsillar erythema, exudates, or enlargement; Moist mucous membranes  NECK: Supple, No JVD, Normal thyroid  HEART: Regular rate and rhythm; No murmurs, rubs, or gallops  RESPIRATORY: Unlabored respirations. CTA B/L, No W/R/R  ABDOMEN: Soft, Nontender, Nondistended; Bowel sounds present  NEUROLOGY: A&Ox3, nonfocal, moving all extremities  EXTREMITIES:  2+ Peripheral Pulses, No clubbing, cyanosis, or edema  SKIN: warm, dry, normal color, no rash or abnormal lesions         Admission HPI: HPI: 69yo male with PMHx HTN, pAfib (stopped taking eliquis last year- noncompliant), presents for LUE weakness and slurred speech. 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 At that time his friend also noticed slurred speech. He believes it happened about 4:15-4:30pm. He states he has a history of Afib but has been in normal rhythm since last year, he chose to stop eliquis on his own. Denies chest pain, palpitation, HA, sob, abd pain, nausea. LE weakness  Code Stroke called in ED  CTH negative  given TNK at 17:54    Brief  hospital course: Admitted to ICU for stroke s/p TPA. Symptoms improved. Eliquis now resumed. Echo with normal bubble study. Neurology and Cardiology consult appreciated. Of note, episodes of pauses on telemetry overnight while sleeping. EP eval appreciated.     Hospital course (by problem):   #Right cortical stroke (likely embolic) s/p TNK  Echo with normal bubble study  Continue Eliquis and high intensity statin  Neuro consult appreciated -- follow up outpatient   PT recommendations noted, no skilled needs     #HTN- Controlled  Continue to monitor. Resume Losartan outpatient if BP elevated. Goal SBP <140    #HLD -   Continue Atorvastatin 80mg OD    #Paroxysmal Afib - rate controlled, bradycardic  #Second degree AVB type 1 and pauses likely secondary to untreated SHANT  -Pauses and HB occurring on telemetry overnight, asymptomatic while sleeping  -Per patient, has been told he has pauses at night in the past. Has done outpatient sleep study  -EP consulted   -Cardiology eval appreciated   Continue Eliquis 5mg BID    Patient was discharged to: Home (home care not needed)     Physical exam at the time of discharge:  LOS: 3d    VITALS:   T(C): 36.7 (06-15-25 @ 11:48), Max: 36.8 (06-14-25 @ 16:05)  HR: 63 (06-15-25 @ 11:48) (57 - 67)  BP: 131/85 (06-15-25 @ 11:48) (104/67 - 141/91)  RR: 18 (06-15-25 @ 11:48) (18 - 19)  SpO2: 98% (06-15-25 @ 11:48) (94% - 99%)    PHYSICAL EXAM:  GENERAL: NAD, well-groomed, well-developed  HEAD:  Atraumatic, Normocephalic  EYES: EOMI, PERRLA, conjunctiva and sclera clear  ENMT: No tonsillar erythema, exudates, or enlargement; Moist mucous membranes  NECK: Supple, No JVD, Normal thyroid  HEART: Regular rate and rhythm; No murmurs, rubs, or gallops  RESPIRATORY: Unlabored respirations. CTA B/L, No W/R/R  ABDOMEN: Soft, Nontender, Nondistended; Bowel sounds present  NEUROLOGY: A&Ox3, nonfocal, moving all extremities  EXTREMITIES:  2+ Peripheral Pulses, No clubbing, cyanosis, or edema  SKIN: warm, dry, normal color, no rash or abnormal lesions         Admission HPI: HPI: 71yo male with PMHx HTN, pAfib (stopped taking eliquis last year- noncompliant), presents for LUE weakness and slurred speech. 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 At that time his friend also noticed slurred speech. He believes it happened about 4:15-4:30pm. He states he has a history of Afib but has been in normal rhythm since last year, he chose to stop eliquis on his own. Denies chest pain, palpitation, HA, sob, abd pain, nausea. LE weakness  Code Stroke called in ED  CTH negative  given TNK at 17:54    Brief  hospital course: Admitted to ICU for stroke s/p TPA. Symptoms improved. Eliquis now resumed. Echo with normal bubble study. Neurology and Cardiology consult appreciated. Of note, episodes of pauses on telemetry overnight while sleeping. EP eval appreciated. Outpatient follow up for sleep apnea treatment.     Hospital course (by problem):   #Right cortical stroke (likely embolic) s/p TNK  Echo with normal bubble study  Continue Eliquis and high intensity statin  Neuro consult appreciated -- follow up outpatient   PT recommendations noted, no skilled needs     #HTN- Controlled  Continue to monitor. Resumed Losartan to optimize BP    #HLD -   Continue Atorvastatin 80mg OD    #Paroxysmal Afib - rate controlled, bradycardic  #Second degree AVB type 1 and pauses likely secondary to untreated SHANT  -Pauses and HB occurring on telemetry overnight, asymptomatic while sleeping  -Per patient, has been told he has pauses at night in the past. Has done outpatient sleep study but needs CPAP for treatment. Must follow up   -EP consult appreciated   -Cardiology eval appreciated   Continue Eliquis 5mg BID    #Hx of sleep apnea  Has had sleep study in the past, needs to start tx. Educated pt on importance.    Patient was discharged to: Home (home care not needed)     Physical exam at the time of discharge:  LOS: 3d    VITALS:   T(C): 36.7 (06-15-25 @ 11:48), Max: 36.8 (06-14-25 @ 16:05)  HR: 63 (06-15-25 @ 11:48) (57 - 67)  BP: 131/85 (06-15-25 @ 11:48) (104/67 - 141/91)  RR: 18 (06-15-25 @ 11:48) (18 - 19)  SpO2: 98% (06-15-25 @ 11:48) (94% - 99%)    PHYSICAL EXAM:  GENERAL: NAD, well-groomed, well-developed  HEAD:  Atraumatic, Normocephalic  EYES: EOMI, PERRLA, conjunctiva and sclera clear  ENMT: No tonsillar erythema, exudates, or enlargement; Moist mucous membranes  NECK: Supple, No JVD, Normal thyroid  HEART: Regular rate and rhythm; No murmurs, rubs, or gallops  RESPIRATORY: Unlabored respirations. CTA B/L, No W/R/R  ABDOMEN: Soft, Nontender, Nondistended; Bowel sounds present  NEUROLOGY: A&Ox3, nonfocal, moving all extremities  EXTREMITIES:  2+ Peripheral Pulses, No clubbing, cyanosis, or edema  SKIN: warm, dry, normal color, no rash or abnormal lesions

## 2025-06-15 NOTE — DISCHARGE NOTE PROVIDER - NSDCFUADDAPPT_GEN_ALL_CORE_FT
CARDIOLOGY FOLLOW UP APPOINTMENT: 6/18/25 @4PM AT THE [facility] WITH KATTY MANCILLA APPTS ARE READY TO BE MADE: [X] YES    Best Family or Patient Contact (if needed):    Additional Information about above appointments (if needed):    1:  2:  3:  APPTS ARE READY TO BE MADE: [X] YES    Best Family or Patient Contact (if needed):    Additional Information about above appointments (if needed):    1:  2:  3:     Prior to outreaching the patient, it was visible that the patient has secured a follow up appointment which was not scheduled by our team. Patient is scheduled with Dr. Lucero on 6/18 at [address] [place] [address]    Prior to outreaching the patient, it was visible that the patient has secured a follow up appointment which was not scheduled by our team. Patient is scheduled with Dr. Nash on 6/23 at [address] [place] [address]    Prior to outreaching the patient, it was visible that the patient has secured a follow up appointment which was not scheduled by our team. Patient is scheduled to see Dr. Quijano on 7/1 at [address] [place] [address]

## 2025-06-15 NOTE — DISCHARGE NOTE PROVIDER - CARE PROVIDERS DIRECT ADDRESSES
,DirectAddress_Unknown,[email or web address] ,[email or web address]

## 2025-06-15 NOTE — DISCHARGE NOTE PROVIDER - PROVIDER RX CONTACT NUMBER
Per V.O. Dr. Cagle, applied arm sling to right arm in office. Patient tolerated well. CMTS intact.  Patient educated on use of DME supplied, was able to demonstrate back to writer proper use. Patient advised to call urgent care with any questions/concerns regarding DME provided.   [phone]

## 2025-06-15 NOTE — DISCHARGE NOTE PROVIDER - NSDCMRMEDTOKEN_GEN_ALL_CORE_FT
Eliquis 5 mg oral tablet: 1 tab(s) orally 2 times a day  losartan 25 mg oral tablet: 1 tab(s) orally once a day   atorvastatin 80 mg oral tablet: 1 tab(s) orally once a day (at bedtime)  Eliquis 5 mg oral tablet: 1 tab(s) orally 2 times a day  losartan 25 mg oral tablet: 1 tab(s) orally once a day

## 2025-06-15 NOTE — DISCHARGE NOTE PROVIDER - NSDCCPCAREPLAN_GEN_ALL_CORE_FT
PRINCIPAL DISCHARGE DIAGNOSIS  Diagnosis: Acute CVA (cerebrovascular accident)  Assessment and Plan of Treatment: You were admitted to the hospital because you had a stroke. This is when a blood clot blocks a blood vessel in your brain, and causes damage to part of the brain that control parts of the body. Tenecteplase (TNKase) is a "clot-busting" medication (thrombolytic) used to treat certain types of acute strokes. It works by dissolving blood clots that block blood flow to the brain, which can minimize stroke damage and improve outcomes. You received this treatment and your symptoms improved. Untreated atrial fibrillation can lead to recurrent strokes. Please continue Eliquis 5 mg twice per day and Atorvastatin 80 mg once daily at bedtime to prevent further strokes.   Follow up with your Cardiologist within 2 weeks of discharge.  Follow up with the Neurologist within 1 month of discharge.      SECONDARY DISCHARGE DIAGNOSES  Diagnosis: HTN (hypertension)  Assessment and Plan of Treatment: We have been holding your home blood pressure medication Losartan while in the hospital since your blood pressure has been controlled. You will have your blood pressure checked during your follow up visit with Cardiology -- Losartan will be resumed if indicated.     PRINCIPAL DISCHARGE DIAGNOSIS  Diagnosis: Acute CVA (cerebrovascular accident)  Assessment and Plan of Treatment: You were admitted to the hospital because you had a stroke. This is when a blood clot blocks a blood vessel in your brain, and causes damage to part of the brain that control parts of the body. Tenecteplase (TNKase) is a "clot-busting" medication (thrombolytic) used to treat certain types of acute strokes. It works by dissolving blood clots that block blood flow to the brain, which can minimize stroke damage and improve outcomes. You received this treatment and your symptoms improved. Untreated atrial fibrillation can lead to recurrent strokes. Please continue Eliquis 5 mg twice per day and Atorvastatin 80 mg once daily at bedtime to prevent further strokes.   Follow up with your Cardiologist Dr. Torre on scheduled appointment on 6/23/25.   Follow up with the Neurologist within 1 month of discharge.      SECONDARY DISCHARGE DIAGNOSES  Diagnosis: HTN (hypertension)  Assessment and Plan of Treatment: Resume your home Losartan on discharge. Follow up with your Cardiologist Dr. Torre on scheduled appointment on 6/23/25 for blood pressure management.    Diagnosis: History of sleep apnea  Assessment and Plan of Treatment: It is important to follow up with your outpatient provider to start  treatment for your sleep apnea with CPAP machine. Untreated sleep apnea can increase the risk of high blood pressure, irregular heartbeats (including atrial fibrillation), heart attacks, and heart failure.     PRINCIPAL DISCHARGE DIAGNOSIS  Diagnosis: Acute CVA (cerebrovascular accident)  Assessment and Plan of Treatment: You were admitted to the hospital because you had a stroke. This is when a blood clot blocks a blood vessel in your brain, and causes damage to part of the brain that control parts of the body. Tenecteplase (TNKase) is a "clot-busting" medication (thrombolytic) used to treat certain types of acute strokes. It works by dissolving blood clots that block blood flow to the brain, which can minimize stroke damage and improve outcomes. You received this treatment and your symptoms improved. Untreated atrial fibrillation can lead to recurrent strokes. Please continue Eliquis 5 mg twice per day and Atorvastatin 80 mg once daily at bedtime to prevent further strokes.   Follow up with your Cardiologist Dr. Torre on scheduled appointment on 6/23/25.   Follow up with the Neurologist within 1 month of discharge.      SECONDARY DISCHARGE DIAGNOSES  Diagnosis: HTN (hypertension)  Assessment and Plan of Treatment: Resume your home Losartan on discharge. Follow up with your Cardiologist Dr. Torre on scheduled appointment on 6/23/25 for blood pressure management.    Diagnosis: History of sleep apnea  Assessment and Plan of Treatment: It is important to follow up with your outpatient provider to start  treatment for your sleep apnea with CPAP machine. Untreated sleep apnea can increase the risk of high blood pressure, strokes, irregular heartbeats (including atrial fibrillation), heart attacks, and heart failure.

## 2025-06-15 NOTE — PROGRESS NOTE ADULT - ASSESSMENT
71yo male with PMHx HTN, pAfib (stopped taking eliquis last year- noncompliant), presents for LUE weakness and slurred speech. 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 At that time his friend also noticed slurred speech. He believes it happened about 4:15-4:30pm. He states he has a history of Afib but has been in normal rhythm since last year, he chose to stop eliquis on his own. Denies chest pain, palpitation, HA, sob, abd pain, nausea. LE weakness    Patient previously following with [facility] - Dr. Zapata   Echocardiogram done outpatient in 2023 - Mod to severe MR, NLVEF   echo this admission shows trace MR normal LV function IASA but no PFO by bubble study       Probable CVA s/p TPA  Hypertension  Hx of PAF - non compliant with Eliquis now back on eliquis and understands importance , He assures me he will be compliant in the future   Hyperlipidemia  cont statins   Atorvastatin  Episodes of second degree AVB type 1 likely secondary to untreated SHANT which is likely also contributing to PAF   Need EP eval prior to DC home , may need long term monitoring

## 2025-06-15 NOTE — DISCHARGE NOTE PROVIDER - PROVIDER TOKENS
PROVIDER:[TOKEN:[[id number]],SCHEDULEDAPPT:[06/18/2025]],PROVIDER:[TOKEN:[[id number]],FOLLOWUP:[Routine]] PROVIDER:[TOKEN:[[id number]],FOLLOWUP:[1 month]],PROVIDER:[TOKEN:[[id number]],SCHEDULEDAPPT:[06/23/2025]]

## 2025-06-15 NOTE — PROGRESS NOTE ADULT - SUBJECTIVE AND OBJECTIVE BOX
Patient is a 70y old  Male who presents with a chief complaint of slurred speech, LUE weakness (14 Jun 2025 13:21)    6/15- episodes of wenchebach and pauses overnight . While asleep    MEDICATIONS  (STANDING):  apixaban 5 milliGRAM(s) Oral two times a day  atorvastatin 80 milliGRAM(s) Oral at bedtime  chlorhexidine 4% Liquid 1 Application(s) Topical <User Schedule>    MEDICATIONS  (PRN):            Vital Signs Last 24 Hrs  T(C): 36.8 (15 Lenin 2025 04:25), Max: 36.8 (14 Jun 2025 16:05)  T(F): 98.2 (15 Lenin 2025 04:25), Max: 98.2 (14 Jun 2025 16:05)  HR: 61 (15 Lenin 2025 04:25) (58 - 68)  BP: 116/84 (15 Lenin 2025 04:25) (104/67 - 141/86)  BP(mean): 92 (14 Jun 2025 08:10) (92 - 92)  RR: 18 (15 Lenin 2025 04:25) (18 - 19)  SpO2: 96% (15 Lenin 2025 04:25) (95% - 99%)    Parameters below as of 15 Lenin 2025 04:25  Patient On (Oxygen Delivery Method): room air                INTERPRETATION OF TELEMETRY:    ECG:        LABS:                        13.6   5.93  )-----------( 170      ( 14 Jun 2025 07:01 )             41.7     06-14    137  |  108  |  14  ----------------------------<  101[H]  4.2   |  24  |  0.76    Ca    8.9      14 Jun 2025 07:01  Phos  3.3     06-14  Mg     2.2     06-14            Urinalysis Basic - ( 14 Jun 2025 07:01 )    Color: x / Appearance: x / SG: x / pH: x  Gluc: 101 mg/dL / Ketone: x  / Bili: x / Urobili: x   Blood: x / Protein: x / Nitrite: x   Leuk Esterase: x / RBC: x / WBC x   Sq Epi: x / Non Sq Epi: x / Bacteria: x      I&O's Summary    BNP  RADIOLOGY & ADDITIONAL STUDIES:

## 2025-06-15 NOTE — PROGRESS NOTE ADULT - SUBJECTIVE AND OBJECTIVE BOX
HOSPITALIST PROGRESS NOTE    SUBJECTIVE / INTERVAL HPI: Patient seen and examined at bedside. Feels well. Episodes of 2nd degree HB and pauses while sleeping overnight. EP consulted     ROS: All 10 systems reviewed and found to be negative with the exception of what has been described above.     VITAL SIGNS:  Vital Signs Last 24 Hrs  T(C): 36.7 (15 Lenin 2025 11:48), Max: 36.8 (14 Jun 2025 16:05)  T(F): 98.1 (15 Lenin 2025 11:48), Max: 98.2 (14 Jun 2025 16:05)  HR: 63 (15 Lenin 2025 11:48) (57 - 67)  BP: 131/85 (15 Lenin 2025 11:48) (104/67 - 141/91)  BP(mean): --  RR: 18 (15 Lenin 2025 11:48) (18 - 19)  SpO2: 98% (15 Lenin 2025 11:48) (94% - 99%)    Parameters below as of 15 Lenin 2025 11:48  Patient On (Oxygen Delivery Method): room air    PHYSICAL EXAM:  General: No acute distress  HEENT: NC/AT; PERRL, anicteric sclera; MMM  Neck: Supple  Cardiovascular: +S1/S2, RRR, no murmurs, rubs, gallops  Respiratory: CTA B/L; no W/R/R  Gastrointestinal: soft, NT/ND; +BSx4  Extremities: WWP; no edema, clubbing or cyanosis  Vascular: 2+ radial, DP/PT pulses B/L  Neurological: AAOx3; no focal deficits    MEDICATIONS:  MEDICATIONS  (STANDING):  apixaban 5 milliGRAM(s) Oral two times a day  atorvastatin 80 milliGRAM(s) Oral at bedtime  chlorhexidine 4% Liquid 1 Application(s) Topical <User Schedule>    MEDICATIONS  (PRN):      ALLERGIES:  Allergies    No Known Allergies    Intolerances        LABS:                        13.6   5.93  )-----------( 170      ( 14 Jun 2025 07:01 )             41.7     06-14    137  |  108  |  14  ----------------------------<  101[H]  4.2   |  24  |  0.76    Ca    8.9      14 Jun 2025 07:01  Phos  3.3     06-14  Mg     2.2     06-14        Urinalysis Basic - ( 14 Jun 2025 07:01 )    Color: x / Appearance: x / SG: x / pH: x  Gluc: 101 mg/dL / Ketone: x  / Bili: x / Urobili: x   Blood: x / Protein: x / Nitrite: x   Leuk Esterase: x / RBC: x / WBC x   Sq Epi: x / Non Sq Epi: x / Bacteria: x      CAPILLARY BLOOD GLUCOSE            RADIOLOGY & ADDITIONAL TESTS: Reviewed.

## 2025-06-15 NOTE — DISCHARGE NOTE PROVIDER - NSDCFUSCHEDAPPT_GEN_ALL_CORE_FT
Darren Torre  [facility]  CARDIOLOGY [address]  Scheduled Appointment: 06/23/2025     [facility]  ELECTROPH [address] [place] [address]  Scheduled Appointment: 08/01/2025

## 2025-06-15 NOTE — PROGRESS NOTE ADULT - ASSESSMENT
69 YO M admitted for acute right Perirolandic infarct, status post TNK.    #Right cortical stroke (likely embolic) s/p TNK  Echo with normal bubble study  Continue Eliquis and high intensity statin  Neuro consult appreciated -- follow up outpatient   PT recommendations noted, no skilled needs     #HTN- Controlled  Continue to monitor. Resume Losartan outpatient if BP elevated     #HLD -   Continue Atorvastatin 80mg OD    #Paroxysmal Afib - rate controlled, bradycardic  #Second degree AVB type 1 and pauses likely secondary to untreated SHANT  -Pauses and HB occurring on telemetry overnight, asymptomatic while sleeping  -Per patient, has been told he has pauses at night in the past. Has done outpatient sleep study  -EP consulted   -Cardiology eval appreciated   Continue Eliquis 5mg BID    DISPO: Discharge home if cleared by EP, pending evaluation

## 2025-06-16 VITALS
OXYGEN SATURATION: 96 % | SYSTOLIC BLOOD PRESSURE: 142 MMHG | HEART RATE: 85 BPM | DIASTOLIC BLOOD PRESSURE: 89 MMHG | TEMPERATURE: 98 F | RESPIRATION RATE: 18 BRPM

## 2025-06-16 LAB — B BURGDOR DNA SPEC QL NAA+PROBE: NEGATIVE — SIGNIFICANT CHANGE UP

## 2025-06-16 PROCEDURE — 99239 HOSP IP/OBS DSCHRG MGMT >30: CPT

## 2025-06-16 RX ORDER — ATORVASTATIN CALCIUM 80 MG/1
1 TABLET, FILM COATED ORAL
Qty: 30 | Refills: 0
Start: 2025-06-16 | End: 2025-07-15

## 2025-06-16 RX ORDER — LOSARTAN POTASSIUM 100 MG/1
1 TABLET, FILM COATED ORAL
Refills: 0 | DISCHARGE

## 2025-06-16 RX ORDER — APIXABAN 2.5 MG/1
1 TABLET, FILM COATED ORAL
Qty: 60 | Refills: 1
Start: 2025-06-16 | End: 2025-08-14

## 2025-06-16 RX ORDER — LOSARTAN POTASSIUM 100 MG/1
1 TABLET, FILM COATED ORAL
Qty: 30 | Refills: 0
Start: 2025-06-16 | End: 2025-07-15

## 2025-06-16 RX ADMIN — APIXABAN 5 MILLIGRAM(S): 2.5 TABLET, FILM COATED ORAL at 09:08

## 2025-06-16 NOTE — CHART NOTE - NSCHARTNOTEFT_GEN_A_CORE
FROM H&P: 69yo male with PMHx HTN, pAfib (stopped taking eliquis last year- noncompliant), presents for LUE weakness and slurred speech. 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 At that time his friend also noticed slurred speech. He believes it happened about 4:15-4:30pm. He states he has a history of Afib but has been in normal rhythm since last year, he chose to stop eliquis on his own. Denies chest pain, palpitation, HA, sob, abd pain, nausea. LE weakness    Code Stroke called in ED  CTH negative  given TNK at 17:54    In ED, pt still with slurred speech, L sided facial droop and mild LUE weakness with  strength. /80, in sinus rhythm (12 Jun 2025 18:32)    6/16/25: Patient noted to have multiple pauses with sleep with longest pause 6 seconds. EP asked to evaluate for nocturnal bradycardia, pause.  All bradycardia and pauses occuring with sleep in the setting o untreated sleep apnea.  Recommend sleep study, sleep apnea treatment with CPAP. No further EP intervention indicated at this time.  Please reconsult as needed.  Plan discussed with Dr. Malloy
Informed by nurse patient with 6 sec pause overnight.   F/u with cardiology in am.

## 2025-06-16 NOTE — PHARMACOTHERAPY INTERVENTION NOTE - COMMENTS
Patient being discharged on Eliquis for atrial fibrillation/cardioembolic stroke, $30 copay and medication stock confirmed with outpatient [facility]. 
Medication history complete, reviewed medications with patient and confirmed with doctor first med hx.

## 2025-06-16 NOTE — PROVIDER CONTACT NOTE (OTHER) - ACTION/TREATMENT ORDERED:
As per Provider Aydee Powell, wait for EP to see patient in morning as long as vitals stay stable.
Cardiology consult

## 2025-06-16 NOTE — PROGRESS NOTE ADULT - REASON FOR ADMISSION
slurred speech, LUE weakness

## 2025-06-16 NOTE — PROGRESS NOTE ADULT - ASSESSMENT
69yo male with PMHx HTN, pAfib (stopped taking eliquis last year- noncompliant), presents for LUE weakness and slurred speech. 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 At that time his friend also noticed slurred speech. He believes it happened about 4:15-4:30pm. He states he has a history of Afib but has been in normal rhythm since last year, he chose to stop eliquis on his own. Denies chest pain, palpitation, HA, sob, abd pain, nausea. LE weakness    Patient previously following with [facility] - Dr. 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

## 2025-06-16 NOTE — PROGRESS NOTE ADULT - PROVIDER SPECIALTY LIST ADULT
Critical Care
Neurology
Neurology
Hospitalist
Neurology
Cardiology
Internal Medicine

## 2025-06-16 NOTE — PROVIDER CONTACT NOTE (OTHER) - SITUATION
Pt had 2.5 second pause followed by a 2.8 second pause. HR was 28. Pt asymptomatic, sleeping in bed when occurred.
Pt had 6.35 sec pause on telemetry

## 2025-06-16 NOTE — PROVIDER CONTACT NOTE (OTHER) - BACKGROUND
Pt admitted on 6/12, code stroke in ED. TNK given. Pt (+) for Rt acute infarct. Downgraded to 3N on 6/14. Hx of PAF. Pt has been SB on tele hr 50's.
Pt admitted positive CVA. Pt night prior had 6.5 second pause. Pt vitals stable. EP was contacted day prior to see patient

## 2025-06-17 LAB
A PHAGOCYTOPH IGG TITR SER IF: SIGNIFICANT CHANGE UP
B BURGDOR AB SER QL IA: 0.32 IV — SIGNIFICANT CHANGE UP
B MICROTI IGG TITR SER: SIGNIFICANT CHANGE UP
E CHAFFEENSIS IGG TITR SER IF: SIGNIFICANT CHANGE UP

## 2025-06-21 ENCOUNTER — NON-APPOINTMENT (OUTPATIENT)
Age: 70
End: 2025-06-21

## 2025-06-23 ENCOUNTER — APPOINTMENT (OUTPATIENT)
Dept: CARDIOLOGY | Facility: CLINIC | Age: 70
End: 2025-06-23
Payer: COMMERCIAL

## 2025-06-23 VITALS
HEIGHT: 76 IN | DIASTOLIC BLOOD PRESSURE: 90 MMHG | OXYGEN SATURATION: 94 % | SYSTOLIC BLOOD PRESSURE: 154 MMHG | BODY MASS INDEX: 35.31 KG/M2 | WEIGHT: 290 LBS | HEART RATE: 67 BPM

## 2025-06-23 PROCEDURE — 99204 OFFICE O/P NEW MOD 45 MIN: CPT

## 2025-06-23 PROCEDURE — 93000 ELECTROCARDIOGRAM COMPLETE: CPT

## 2025-06-23 PROCEDURE — G2211 COMPLEX E/M VISIT ADD ON: CPT | Mod: NC

## 2025-06-23 RX ORDER — LOSARTAN POTASSIUM 25 MG/1
25 TABLET, FILM COATED ORAL DAILY
Refills: 0 | Status: ACTIVE | COMMUNITY

## 2025-06-23 RX ORDER — APIXABAN 5 MG/1
5 TABLET, FILM COATED ORAL TWICE DAILY
Refills: 0 | Status: ACTIVE | COMMUNITY

## 2025-06-23 RX ORDER — ATORVASTATIN CALCIUM 80 MG/1
80 TABLET, FILM COATED ORAL DAILY
Refills: 0 | Status: ACTIVE | COMMUNITY

## 2025-06-24 ENCOUNTER — NON-APPOINTMENT (OUTPATIENT)
Age: 70
End: 2025-06-24

## 2025-06-30 DIAGNOSIS — I10 ESSENTIAL (PRIMARY) HYPERTENSION: ICD-10-CM

## 2025-06-30 DIAGNOSIS — E87.6 HYPOKALEMIA: ICD-10-CM

## 2025-06-30 DIAGNOSIS — K21.9 GASTRO-ESOPHAGEAL REFLUX DISEASE WITHOUT ESOPHAGITIS: ICD-10-CM

## 2025-06-30 DIAGNOSIS — I44.1 ATRIOVENTRICULAR BLOCK, SECOND DEGREE: ICD-10-CM

## 2025-06-30 DIAGNOSIS — G83.24 MONOPLEGIA OF UPPER LIMB AFFECTING LEFT NONDOMINANT SIDE: ICD-10-CM

## 2025-06-30 DIAGNOSIS — I48.0 PAROXYSMAL ATRIAL FIBRILLATION: ICD-10-CM

## 2025-06-30 DIAGNOSIS — R47.81 SLURRED SPEECH: ICD-10-CM

## 2025-06-30 DIAGNOSIS — Z79.01 LONG TERM (CURRENT) USE OF ANTICOAGULANTS: ICD-10-CM

## 2025-06-30 DIAGNOSIS — R29.703 NIHSS SCORE 3: ICD-10-CM

## 2025-06-30 DIAGNOSIS — I63.40 CEREBRAL INFARCTION DUE TO EMBOLISM OF UNSPECIFIED CEREBRAL ARTERY: ICD-10-CM

## 2025-06-30 DIAGNOSIS — R47.1 DYSARTHRIA AND ANARTHRIA: ICD-10-CM

## 2025-06-30 DIAGNOSIS — G47.33 OBSTRUCTIVE SLEEP APNEA (ADULT) (PEDIATRIC): ICD-10-CM

## 2025-06-30 DIAGNOSIS — I34.0 NONRHEUMATIC MITRAL (VALVE) INSUFFICIENCY: ICD-10-CM

## 2025-07-01 ENCOUNTER — APPOINTMENT (OUTPATIENT)
Dept: NEUROLOGY | Facility: CLINIC | Age: 70
End: 2025-07-01
Payer: COMMERCIAL

## 2025-07-01 VITALS
DIASTOLIC BLOOD PRESSURE: 80 MMHG | BODY MASS INDEX: 34.83 KG/M2 | TEMPERATURE: 98.2 F | HEIGHT: 76 IN | SYSTOLIC BLOOD PRESSURE: 135 MMHG | WEIGHT: 286 LBS | HEART RATE: 67 BPM

## 2025-07-01 PROBLEM — G47.33 OSA (OBSTRUCTIVE SLEEP APNEA): Status: ACTIVE | Noted: 2025-07-01

## 2025-07-01 PROBLEM — I63.9 ACUTE CEREBRAL INFARCTION: Status: ACTIVE | Noted: 2025-07-01

## 2025-07-01 PROCEDURE — 99204 OFFICE O/P NEW MOD 45 MIN: CPT

## 2025-08-01 ENCOUNTER — APPOINTMENT (OUTPATIENT)
Age: 70
End: 2025-08-01
Payer: MEDICARE

## 2025-08-01 VITALS
HEIGHT: 76 IN | HEART RATE: 63 BPM | WEIGHT: 285 LBS | OXYGEN SATURATION: 97 % | BODY MASS INDEX: 34.7 KG/M2 | SYSTOLIC BLOOD PRESSURE: 136 MMHG | DIASTOLIC BLOOD PRESSURE: 88 MMHG

## 2025-08-01 DIAGNOSIS — I10 ESSENTIAL (PRIMARY) HYPERTENSION: ICD-10-CM

## 2025-08-01 DIAGNOSIS — E78.5 HYPERLIPIDEMIA, UNSPECIFIED: ICD-10-CM

## 2025-08-01 DIAGNOSIS — I48.0 PAROXYSMAL ATRIAL FIBRILLATION: ICD-10-CM

## 2025-08-01 PROCEDURE — 99204 OFFICE O/P NEW MOD 45 MIN: CPT | Mod: 25

## 2025-08-01 PROCEDURE — 93000 ELECTROCARDIOGRAM COMPLETE: CPT

## 2025-08-01 RX ORDER — METOPROLOL TARTRATE 25 MG/1
25 TABLET ORAL
Qty: 45 | Refills: 1 | Status: ACTIVE | COMMUNITY
Start: 2025-08-01 | End: 1900-01-01